# Patient Record
Sex: FEMALE | Race: WHITE | NOT HISPANIC OR LATINO | Employment: FULL TIME | URBAN - METROPOLITAN AREA
[De-identification: names, ages, dates, MRNs, and addresses within clinical notes are randomized per-mention and may not be internally consistent; named-entity substitution may affect disease eponyms.]

---
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
## 2020-01-21 ENCOUNTER — OFFICE VISIT (OUTPATIENT)
Dept: FAMILY MEDICINE CLINIC | Facility: CLINIC | Age: 20
End: 2020-01-21
Payer: COMMERCIAL

## 2020-01-21 ENCOUNTER — APPOINTMENT (OUTPATIENT)
Dept: LAB | Facility: MEDICAL CENTER | Age: 20
End: 2020-01-21
Payer: COMMERCIAL

## 2020-01-21 VITALS
BODY MASS INDEX: 37.05 KG/M2 | WEIGHT: 198 LBS | TEMPERATURE: 98.2 F | DIASTOLIC BLOOD PRESSURE: 80 MMHG | HEART RATE: 76 BPM | SYSTOLIC BLOOD PRESSURE: 118 MMHG | OXYGEN SATURATION: 96 % | RESPIRATION RATE: 16 BRPM

## 2020-01-21 DIAGNOSIS — Z13.220 SCREENING FOR HYPERLIPIDEMIA: ICD-10-CM

## 2020-01-21 DIAGNOSIS — Z23 NEED FOR VACCINATION: ICD-10-CM

## 2020-01-21 DIAGNOSIS — Z77.21 EXPOSURE TO POTENTIALLY HAZARDOUS BODY FLUIDS: ICD-10-CM

## 2020-01-21 DIAGNOSIS — E28.2 PCOS (POLYCYSTIC OVARIAN SYNDROME): ICD-10-CM

## 2020-01-21 DIAGNOSIS — F31.9 BIPOLAR DEPRESSION (HCC): Primary | ICD-10-CM

## 2020-01-21 DIAGNOSIS — Z30.011 OCP (ORAL CONTRACEPTIVE PILLS) INITIATION: ICD-10-CM

## 2020-01-21 DIAGNOSIS — L70.0 ACNE VULGARIS: ICD-10-CM

## 2020-01-21 LAB
ALBUMIN SERPL BCP-MCNC: 3.9 G/DL (ref 3.5–5)
ALP SERPL-CCNC: 98 U/L (ref 46–384)
ALT SERPL W P-5'-P-CCNC: 22 U/L (ref 12–78)
ANION GAP SERPL CALCULATED.3IONS-SCNC: 4 MMOL/L (ref 4–13)
AST SERPL W P-5'-P-CCNC: 11 U/L (ref 5–45)
BILIRUB SERPL-MCNC: 0.17 MG/DL (ref 0.2–1)
BUN SERPL-MCNC: 9 MG/DL (ref 5–25)
CALCIUM SERPL-MCNC: 9.4 MG/DL (ref 8.3–10.1)
CHLORIDE SERPL-SCNC: 107 MMOL/L (ref 100–108)
CO2 SERPL-SCNC: 31 MMOL/L (ref 21–32)
CREAT SERPL-MCNC: 0.9 MG/DL (ref 0.6–1.3)
GFR SERPL CREATININE-BSD FRML MDRD: 93 ML/MIN/1.73SQ M
GLUCOSE SERPL-MCNC: 73 MG/DL (ref 65–140)
HCV AB SER QL: NORMAL
POTASSIUM SERPL-SCNC: 3.8 MMOL/L (ref 3.5–5.3)
PROT SERPL-MCNC: 7.6 G/DL (ref 6.4–8.2)
SODIUM SERPL-SCNC: 142 MMOL/L (ref 136–145)

## 2020-01-21 PROCEDURE — 87491 CHLMYD TRACH DNA AMP PROBE: CPT | Performed by: FAMILY MEDICINE

## 2020-01-21 PROCEDURE — 90471 IMMUNIZATION ADMIN: CPT

## 2020-01-21 PROCEDURE — 86592 SYPHILIS TEST NON-TREP QUAL: CPT

## 2020-01-21 PROCEDURE — 87389 HIV-1 AG W/HIV-1&-2 AB AG IA: CPT

## 2020-01-21 PROCEDURE — 99214 OFFICE O/P EST MOD 30 MIN: CPT | Performed by: FAMILY MEDICINE

## 2020-01-21 PROCEDURE — 86803 HEPATITIS C AB TEST: CPT

## 2020-01-21 PROCEDURE — 80053 COMPREHEN METABOLIC PANEL: CPT | Performed by: FAMILY MEDICINE

## 2020-01-21 PROCEDURE — 36415 COLL VENOUS BLD VENIPUNCTURE: CPT | Performed by: FAMILY MEDICINE

## 2020-01-21 PROCEDURE — 87591 N.GONORRHOEAE DNA AMP PROB: CPT | Performed by: FAMILY MEDICINE

## 2020-01-21 PROCEDURE — 90686 IIV4 VACC NO PRSV 0.5 ML IM: CPT

## 2020-01-21 RX ORDER — QUETIAPINE FUMARATE 25 MG/1
25 TABLET, FILM COATED ORAL
Qty: 90 TABLET | Refills: 1 | Status: SHIPPED | OUTPATIENT
Start: 2020-01-21 | End: 2020-04-14

## 2020-01-21 RX ORDER — DROSPIRENONE AND ETHINYL ESTRADIOL 0.03MG-3MG
1 KIT ORAL DAILY
Qty: 90 TABLET | Refills: 1 | Status: SHIPPED | OUTPATIENT
Start: 2020-01-21 | End: 2020-04-14 | Stop reason: SDUPTHER

## 2020-01-21 RX ORDER — CLINDAMYCIN PHOSPHATE 10 MG/G
GEL TOPICAL 2 TIMES DAILY
Qty: 30 G | Refills: 0 | Status: SHIPPED | OUTPATIENT
Start: 2020-01-21 | End: 2022-06-02

## 2020-01-21 NOTE — ASSESSMENT & PLAN NOTE
Pt has had improvement in depression with Sertraline 25mg, but is now having some more hypomanic symptoms and does have family history of Bipolar 2  Given comorbid sleep disturbances that persist, will do trial of Seroquel 25mg qHS and get baseline labs prior to initiation of meds    Previously tried and failed Lexapro  Will be re-establishing with therapist at school in [place]  Will also place referral for Psychiatry for med management

## 2020-01-21 NOTE — ASSESSMENT & PLAN NOTE
Not much improved with OCPs at this time, so will try different OCP  Add Clindagel in addition to benzoyl peroxide

## 2020-01-21 NOTE — PROGRESS NOTES
FAMILY MEDICINE PROGRESS NOTE  Maximo Mao 23 y o  female   DATE: January 21, 2020     ASSESSMENT and PLAN:  Maximo Mao is a 23 y o  female with:     Problem List Items Addressed This Visit        Endocrine    PCOS (polycystic ovarian syndrome)     Persistent symptoms, unable to lose weight since our last visit  Try Jyoti instead of Danielle  Would also try Metformin in the future if symptoms persist            Musculoskeletal and Integument    Acne vulgaris     Not much improved with OCPs at this time, so will try different OCP  Add Clindagel in addition to benzoyl peroxide         Relevant Medications    clindamycin (CLINDAGEL) 1 % gel       Other    Bipolar depression ([facility] [address] ) - Primary     Pt has had improvement in depression with Sertraline 25mg, but is now having some more hypomanic symptoms and does have family history of Bipolar 2  Given comorbid sleep disturbances that persist, will do trial of Seroquel 25mg qHS and get baseline labs prior to initiation of meds    Previously tried and failed Lexapro  Will be re-establishing with therapist at school in [place]  Will also place referral for Psychiatry for med management         Relevant Medications    QUEtiapine (SEROquel) 25 mg tablet    Other Relevant Orders    Ambulatory referral to Psychiatry    Comprehensive metabolic panel      Other Visit Diagnoses     Exposure to potentially hazardous body fluids        Relevant Orders    HIV 1/2 AG-AB combo    RPR    Chlamydia/GC amplified DNA by PCR    Hepatitis C antibody    Need for vaccination        Relevant Orders    influenza vaccine, [id number], quadrivalent, 0 5 mL, preservative-free, for adult and pediatric patients 6 mos+ (AFLURIA, [address], FLULAVAL, [facility]) (Completed)    Screening for hyperlipidemia        Relevant Orders    Lipid Panel with Direct LDL reflex    OCP (oral contraceptive pills) initiation        Relevant Medications    drospirenone-ethinyl estradiol (JYOTI) 3-0 03 MG per tablet RTC 3 months for recheck and annual  visit    SUBJECTIVE:  Snehal Flaherty is a 23 y o  female who presents today with a chief complaint of Medication Problem  Pt is here to discuss her medications  She is on Jimena for birth control and her periods being abnormal with PCOS, she states she gets her period every 3 months, but still ireregular  Also, has noticed no change in her acne, states she uses benzoyle peroxide and OTC antibiotic soaps without relief  Patient states that she has irregular moods, she has depression and that has been a lot better with the zoloft, but other times her moods are labile she can be crying one minute and fine the next  Also does have anxiety and she will take OTC all natural anxiety gummies that her mother got her  She still is having problems with falling sleep, she gets upset and has racing thoughts  Denies manic episodes, but stays occasionally will have episodes where she feels like she is driven by a motor  She has only been working 3x/week  Her mom has Bipolar 2  Also patient found out that her most recent boyfriend was unfaithful and she is requesting STD screening tests  Review of Systems   Constitutional: Negative for unexpected weight change  Cardiovascular: Negative for palpitations  Genitourinary: Positive for menstrual problem  Psychiatric/Behavioral: Positive for dysphoric mood and sleep disturbance  Negative for suicidal ideas  The patient is nervous/anxious  I have reviewed the patient's Past Medical History  Current Outpatient Medications:     drospirenone-ethinyl estradiol (JIMENA) 3-0 02 MG per tablet, Take 1 tablet by mouth daily, Disp: 30 tablet, Rfl: 4    sertraline (ZOLOFT) 25 mg tablet, Take 1 tablet (25 mg total) by mouth daily, Disp: 90 tablet, Rfl: 2    OBJECTIVE:  /80   Pulse 76   Temp 98 2 °F (36 8 °C)   Resp 16   Wt 89 8 kg (198 lb)   LMP 11/15/2019 (Exact Date)   SpO2 96%   Breastfeeding?  No   BMI 37 05 kg/m² Physical Exam   Constitutional: She appears well-developed and well-nourished  No distress  obese   Skin: She is not diaphoretic  Psychiatric: Her speech is normal and behavior is normal  Judgment and thought content normal  Cognition and memory are normal  She exhibits a depressed mood  She expresses no homicidal and no suicidal ideation  Landy Rivas MD    Note: Portions of the record have been created with voice recognition software  Occasional wrong word or "sound a like" substitutions may have occurred due to the inherent limitations of voice recognition software  Read the chart carefully and recognize, using context, where substitutions have occurred

## 2020-01-21 NOTE — ASSESSMENT & PLAN NOTE
Persistent symptoms, unable to lose weight since our last visit  Try Jyoti instead of Danielle  Would also try Metformin in the future if symptoms persist

## 2020-01-22 LAB
HIV 1+2 AB+HIV1 P24 AG SERPL QL IA: NORMAL
RPR SER QL: NORMAL

## 2020-01-23 LAB
C TRACH DNA SPEC QL NAA+PROBE: NEGATIVE
N GONORRHOEA DNA SPEC QL NAA+PROBE: NEGATIVE

## 2020-04-14 ENCOUNTER — TELEMEDICINE (OUTPATIENT)
Dept: FAMILY MEDICINE CLINIC | Facility: CLINIC | Age: 20
End: 2020-04-14
Payer: COMMERCIAL

## 2020-04-14 DIAGNOSIS — Z30.011 OCP (ORAL CONTRACEPTIVE PILLS) INITIATION: ICD-10-CM

## 2020-04-14 DIAGNOSIS — E28.2 PCOS (POLYCYSTIC OVARIAN SYNDROME): ICD-10-CM

## 2020-04-14 DIAGNOSIS — B96.89 ACUTE BACTERIAL SINUSITIS: Primary | ICD-10-CM

## 2020-04-14 DIAGNOSIS — J01.90 ACUTE BACTERIAL SINUSITIS: Primary | ICD-10-CM

## 2020-04-14 PROCEDURE — 99213 OFFICE O/P EST LOW 20 MIN: CPT | Performed by: FAMILY MEDICINE

## 2020-04-14 RX ORDER — BENZONATATE 200 MG/1
200 CAPSULE ORAL 3 TIMES DAILY PRN
Qty: 20 CAPSULE | Refills: 0 | Status: SHIPPED | OUTPATIENT
Start: 2020-04-14 | End: 2020-05-21

## 2020-04-14 RX ORDER — AMOXICILLIN AND CLAVULANATE POTASSIUM 875; 125 MG/1; MG/1
1 TABLET, FILM COATED ORAL EVERY 12 HOURS SCHEDULED
Qty: 10 TABLET | Refills: 0 | Status: SHIPPED | OUTPATIENT
Start: 2020-04-14 | End: 2020-04-19

## 2020-04-14 RX ORDER — DROSPIRENONE AND ETHINYL ESTRADIOL 0.03MG-3MG
1 KIT ORAL DAILY
Qty: 90 TABLET | Refills: 1 | Status: SHIPPED | OUTPATIENT
Start: 2020-04-14 | End: 2021-04-29 | Stop reason: ALTCHOICE

## 2020-04-23 ENCOUNTER — TELEMEDICINE (OUTPATIENT)
Dept: ENDOCRINOLOGY | Facility: CLINIC | Age: 20
End: 2020-04-23
Payer: COMMERCIAL

## 2020-04-23 DIAGNOSIS — E28.2 PCOS (POLYCYSTIC OVARIAN SYNDROME): Primary | ICD-10-CM

## 2020-04-23 DIAGNOSIS — R53.83 OTHER FATIGUE: ICD-10-CM

## 2020-04-23 DIAGNOSIS — E66.09 CLASS 1 OBESITY DUE TO EXCESS CALORIES WITH SERIOUS COMORBIDITY AND BODY MASS INDEX (BMI) OF 34.0 TO 34.9 IN ADULT: ICD-10-CM

## 2020-04-23 PROCEDURE — 99203 OFFICE O/P NEW LOW 30 MIN: CPT | Performed by: INTERNAL MEDICINE

## 2020-05-11 ENCOUNTER — TELEPHONE (OUTPATIENT)
Dept: FAMILY MEDICINE CLINIC | Facility: CLINIC | Age: 20
End: 2020-05-11

## 2020-05-12 ENCOUNTER — TELEMEDICINE (OUTPATIENT)
Dept: FAMILY MEDICINE CLINIC | Facility: CLINIC | Age: 20
End: 2020-05-12
Payer: COMMERCIAL

## 2020-05-12 ENCOUNTER — LAB (OUTPATIENT)
Dept: LAB | Facility: HOSPITAL | Age: 20
End: 2020-05-12
Attending: INTERNAL MEDICINE
Payer: COMMERCIAL

## 2020-05-12 DIAGNOSIS — E28.2 PCOS (POLYCYSTIC OVARIAN SYNDROME): ICD-10-CM

## 2020-05-12 DIAGNOSIS — N93.9 ABNORMAL UTERINE BLEEDING (AUB): ICD-10-CM

## 2020-05-12 DIAGNOSIS — E66.09 CLASS 1 OBESITY DUE TO EXCESS CALORIES WITH SERIOUS COMORBIDITY AND BODY MASS INDEX (BMI) OF 34.0 TO 34.9 IN ADULT: ICD-10-CM

## 2020-05-12 DIAGNOSIS — N93.9 ABNORMAL UTERINE BLEEDING (AUB): Primary | ICD-10-CM

## 2020-05-12 DIAGNOSIS — R53.83 OTHER FATIGUE: ICD-10-CM

## 2020-05-12 LAB
25(OH)D3 SERPL-MCNC: 20.2 NG/ML (ref 30–100)
EST. AVERAGE GLUCOSE BLD GHB EST-MCNC: 105 MG/DL
HBA1C MFR BLD: 5.3 %
HCG SERPL QL: NEGATIVE
PROLACTIN SERPL-MCNC: 8.1 NG/ML
T4 FREE SERPL-MCNC: 0.96 NG/DL (ref 0.78–1.33)
TSH SERPL DL<=0.05 MIU/L-ACNC: 1.67 UIU/ML (ref 0.46–3.98)

## 2020-05-12 PROCEDURE — 82306 VITAMIN D 25 HYDROXY: CPT

## 2020-05-12 PROCEDURE — 83036 HEMOGLOBIN GLYCOSYLATED A1C: CPT

## 2020-05-12 PROCEDURE — 84146 ASSAY OF PROLACTIN: CPT

## 2020-05-12 PROCEDURE — 84703 CHORIONIC GONADOTROPIN ASSAY: CPT

## 2020-05-12 PROCEDURE — 36415 COLL VENOUS BLD VENIPUNCTURE: CPT

## 2020-05-12 PROCEDURE — 85025 COMPLETE CBC W/AUTO DIFF WBC: CPT

## 2020-05-12 PROCEDURE — 84439 ASSAY OF FREE THYROXINE: CPT

## 2020-05-12 PROCEDURE — 84443 ASSAY THYROID STIM HORMONE: CPT

## 2020-05-12 PROCEDURE — 99214 OFFICE O/P EST MOD 30 MIN: CPT | Performed by: FAMILY MEDICINE

## 2020-05-12 RX ORDER — NAPROXEN 500 MG/1
500 TABLET ORAL 2 TIMES DAILY WITH MEALS
Qty: 20 TABLET | Refills: 0 | Status: SHIPPED | OUTPATIENT
Start: 2020-05-12 | End: 2020-05-21 | Stop reason: SDUPTHER

## 2020-05-13 ENCOUNTER — TELEPHONE (OUTPATIENT)
Dept: ENDOCRINOLOGY | Facility: CLINIC | Age: 20
End: 2020-05-13

## 2020-05-14 ENCOUNTER — TELEMEDICINE (OUTPATIENT)
Dept: FAMILY MEDICINE CLINIC | Facility: CLINIC | Age: 20
End: 2020-05-14
Payer: COMMERCIAL

## 2020-05-14 ENCOUNTER — TELEPHONE (OUTPATIENT)
Dept: FAMILY MEDICINE CLINIC | Facility: CLINIC | Age: 20
End: 2020-05-14

## 2020-05-14 DIAGNOSIS — N93.9 ABNORMAL UTERINE BLEEDING (AUB): Primary | ICD-10-CM

## 2020-05-14 DIAGNOSIS — E55.9 VITAMIN D DEFICIENCY: ICD-10-CM

## 2020-05-14 PROCEDURE — 99213 OFFICE O/P EST LOW 20 MIN: CPT | Performed by: FAMILY MEDICINE

## 2020-05-15 LAB
EOSINOPHIL # BLD AUTO: 0.07 THOUSAND/ΜL (ref 0–0.61)
EOSINOPHIL NFR BLD AUTO: 1 % (ref 0–6)
ERYTHROCYTE [DISTWIDTH] IN BLOOD BY AUTOMATED COUNT: 12.5 % (ref 11.6–15.1)
HCT VFR BLD AUTO: 41.9 % (ref 34.8–46.1)
HGB BLD-MCNC: 13.8 G/DL (ref 11.5–15.4)
LYMPHOCYTES # BLD AUTO: 2.59 THOUSANDS/ΜL (ref 0.6–4.47)
LYMPHOCYTES NFR BLD AUTO: 25 % (ref 14–44)
MCH RBC QN AUTO: 28.9 PG (ref 26.8–34.3)
MCHC RBC AUTO-ENTMCNC: 32.9 G/DL (ref 31.4–37.4)
MCV RBC AUTO: 88 FL (ref 82–98)
MONOCYTES # BLD AUTO: 0.83 THOUSAND/ΜL (ref 0.17–1.22)
MONOCYTES NFR BLD AUTO: 8 % (ref 4–12)
NEUTROPHILS # BLD AUTO: 6.81 THOUSANDS/ΜL (ref 1.85–7.62)
NEUTS SEG NFR BLD AUTO: 66 % (ref 43–75)
NRBC BLD AUTO-RTO: 0 /100 WBCS
PLATELET # BLD AUTO: 280 THOUSANDS/UL (ref 149–390)
PMV BLD AUTO: 11.2 FL (ref 8.9–12.7)
RBC # BLD AUTO: 4.78 MILLION/UL (ref 3.81–5.12)
WBC # BLD AUTO: 10.38 THOUSAND/UL (ref 4.31–10.16)

## 2020-05-21 ENCOUNTER — TELEMEDICINE (OUTPATIENT)
Dept: FAMILY MEDICINE CLINIC | Facility: CLINIC | Age: 20
End: 2020-05-21
Payer: COMMERCIAL

## 2020-05-21 VITALS — BODY MASS INDEX: 35.36 KG/M2 | WEIGHT: 189 LBS

## 2020-05-21 DIAGNOSIS — E28.2 PCOS (POLYCYSTIC OVARIAN SYNDROME): ICD-10-CM

## 2020-05-21 DIAGNOSIS — N93.9 ABNORMAL UTERINE BLEEDING (AUB): Primary | ICD-10-CM

## 2020-05-21 PROCEDURE — 99213 OFFICE O/P EST LOW 20 MIN: CPT | Performed by: FAMILY MEDICINE

## 2020-05-21 RX ORDER — NAPROXEN 500 MG/1
500 TABLET ORAL 2 TIMES DAILY PRN
Qty: 60 TABLET | Refills: 0 | Status: SHIPPED | OUTPATIENT
Start: 2020-05-21 | End: 2022-06-02

## 2020-06-10 ENCOUNTER — TELEPHONE (OUTPATIENT)
Dept: FAMILY MEDICINE CLINIC | Facility: CLINIC | Age: 20
End: 2020-06-10

## 2020-06-11 ENCOUNTER — TELEMEDICINE (OUTPATIENT)
Dept: FAMILY MEDICINE CLINIC | Facility: CLINIC | Age: 20
End: 2020-06-11
Payer: COMMERCIAL

## 2020-06-11 DIAGNOSIS — N93.9 ABNORMAL UTERINE BLEEDING (AUB): Primary | ICD-10-CM

## 2020-06-11 DIAGNOSIS — E28.2 PCOS (POLYCYSTIC OVARIAN SYNDROME): ICD-10-CM

## 2020-06-11 PROCEDURE — 99214 OFFICE O/P EST MOD 30 MIN: CPT | Performed by: FAMILY MEDICINE

## 2020-06-19 ENCOUNTER — TELEPHONE (OUTPATIENT)
Dept: PSYCHIATRY | Facility: CLINIC | Age: 20
End: 2020-06-19

## 2020-09-02 ENCOUNTER — TELEMEDICINE (OUTPATIENT)
Dept: PSYCHIATRY | Facility: CLINIC | Age: 20
End: 2020-09-02
Payer: COMMERCIAL

## 2020-09-02 VITALS — WEIGHT: 205 LBS | BODY MASS INDEX: 38.71 KG/M2 | HEIGHT: 61 IN

## 2020-09-02 DIAGNOSIS — E28.2 PCOS (POLYCYSTIC OVARIAN SYNDROME): ICD-10-CM

## 2020-09-02 DIAGNOSIS — F33.1 MAJOR DEPRESSIVE DISORDER, RECURRENT EPISODE, MODERATE (HCC): Primary | ICD-10-CM

## 2020-09-02 DIAGNOSIS — Z86.59: ICD-10-CM

## 2020-09-02 DIAGNOSIS — F31.9 BIPOLAR DEPRESSION (HCC): ICD-10-CM

## 2020-09-02 DIAGNOSIS — R63.2 BINGE EATING: ICD-10-CM

## 2020-09-02 DIAGNOSIS — F40.10 SOCIAL ANXIETY DISORDER: ICD-10-CM

## 2020-09-02 PROBLEM — F39 MOOD DISORDER (HCC): Status: RESOLVED | Noted: 2019-01-29 | Resolved: 2020-09-02

## 2020-09-02 PROCEDURE — 90792 PSYCH DIAG EVAL W/MED SRVCS: CPT | Performed by: NURSE PRACTITIONER

## 2020-09-02 NOTE — BH TREATMENT PLAN
Treatment  Plan not complete within required time limits of 30 days from initial visit due to:  Current emotional state/ Extensive Medication Management/ Counseling was provided during the session and that took most of the office visit, will do at next OV
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
## 2020-09-28 ENCOUNTER — OFFICE VISIT (OUTPATIENT)
Dept: OBGYN CLINIC | Facility: CLINIC | Age: 20
End: 2020-09-28
Payer: COMMERCIAL

## 2020-09-28 ENCOUNTER — LAB (OUTPATIENT)
Dept: LAB | Facility: CLINIC | Age: 20
End: 2020-09-28
Payer: COMMERCIAL

## 2020-09-28 VITALS
BODY MASS INDEX: 34.97 KG/M2 | TEMPERATURE: 98.6 F | HEIGHT: 61 IN | WEIGHT: 185.2 LBS | DIASTOLIC BLOOD PRESSURE: 74 MMHG | SYSTOLIC BLOOD PRESSURE: 128 MMHG

## 2020-09-28 DIAGNOSIS — F33.1 MAJOR DEPRESSIVE DISORDER, RECURRENT EPISODE, MODERATE (HCC): ICD-10-CM

## 2020-09-28 DIAGNOSIS — F40.10 SOCIAL ANXIETY DISORDER: ICD-10-CM

## 2020-09-28 DIAGNOSIS — E28.2 PCOS (POLYCYSTIC OVARIAN SYNDROME): ICD-10-CM

## 2020-09-28 DIAGNOSIS — Z00.00 WELL WOMAN EXAM (NO GYNECOLOGICAL EXAM): Primary | ICD-10-CM

## 2020-09-28 LAB
ATRIAL RATE: 60 BPM
CHOLEST SERPL-MCNC: 212 MG/DL (ref 50–200)
HDLC SERPL-MCNC: 40 MG/DL
LDLC SERPL CALC-MCNC: 152 MG/DL (ref 0–100)
NONHDLC SERPL-MCNC: 172 MG/DL
P AXIS: 4 DEGREES
PR INTERVAL: 114 MS
QRS AXIS: 31 DEGREES
QRSD INTERVAL: 86 MS
QT INTERVAL: 410 MS
QTC INTERVAL: 410 MS
T WAVE AXIS: 13 DEGREES
TRIGL SERPL-MCNC: 99 MG/DL
VENTRICULAR RATE: 60 BPM

## 2020-09-28 PROCEDURE — 93010 ELECTROCARDIOGRAM REPORT: CPT | Performed by: INTERNAL MEDICINE

## 2020-09-28 PROCEDURE — 99385 PREV VISIT NEW AGE 18-39: CPT | Performed by: OBSTETRICS & GYNECOLOGY

## 2020-09-28 PROCEDURE — 36415 COLL VENOUS BLD VENIPUNCTURE: CPT

## 2020-09-28 PROCEDURE — 93005 ELECTROCARDIOGRAM TRACING: CPT

## 2020-09-28 PROCEDURE — 80061 LIPID PANEL: CPT

## 2020-09-28 NOTE — PROGRESS NOTES
ASSESSMENT & PLAN: Devi Carrasquillo is a 21 y o  Keri Norberto with normal gynecologic exam     1   Routine well woman exam done today  2   Pap testing will start at age 24 per the ASCCP guidelines  3   Gardasil is recommended for patients from 526 years of age  The patient has had the Gardasil vaccine series  4  The patient is sexually active  She accepted contraception and options have been discussed  Has recently been tested for STDs, gets tested annually  5  PCOS: Continues to have irregular periods not well managed on CHCs; however, her acne has greatly improved  Will follow up to discuss further as a problem visit  6  The following were reviewed in today's visit: breast self exam, STD testing, use and side effects of OCPs, exercise and healthy diet  7  Patient to return to office in 3 months for PCOS follow up  All questions have been answered to her satisfaction  CC:  Annual Gynecologic Examination    HPI: Devi Carrasquillo is a 21 y o  Keri Brine who presents for annual gynecologic examination  She has the following concerns:  Wants to know if she definitely has PCOS  Has irregular cycles despite being on/off birth control  Has acne and hair growth along her chin, though her acne has improved since starting [employer]  Had [place] last year with polycystic appearing ovarian morphology  Health Maintenance:    She exercises 0 days per week  She wears her seatbelt routinely  She does perform regular monthly self breast exams  She feels safe at home  She does follow a well balanced diet      She does not use tobacco    Past Medical History:   Diagnosis Date    Anxiety     Depression     PCOS (polycystic ovarian syndrome)     Polycystic ovary syndrome     Varicella     vaccinated       Past Surgical History:   Procedure Laterality Date    WISDOM TOOTH EXTRACTION         Past OB/Gyn History:  Period Pattern: (!) Irregular  Menstrual Flow: Heavy  Menstrual Control: Tampon, Maxi pad, Thin pad  Dysmenorrhea: (!) Moderate  Dysmenorrhea Symptoms: Cramping, HeadachePatient's last menstrual period was 2020 (exact date)  Menstrual History:  OB History        0    Para   0    Term   0       0    AB   0    Living   0       SAB   0    TAB   0    Ectopic   0    Multiple   0    Live Births   0                  History of sexually transmitted infection No  Patient is currently sexually active  heterosexual Birth control: condoms and combination OCPs      Family History   Problem Relation Age of Onset    Depression Mother     Bipolar disorder Mother     Alcohol abuse Mother         sober X 13 years    Depression Family     Anxiety disorder Family     Diabetes Family     Migraines Family     Asthma Father     Alcohol abuse Father     Ovarian cysts Sister     Anxiety disorder Sister     Crohn's disease Maternal Grandmother     Substance Abuse Maternal Uncle         OD heroin and fentanyl    Substance Abuse Paternal Uncle     ADD / ADHD Half-Brother        Social History:  Social History     Socioeconomic History    Marital status: Single     Spouse name: Not on file    Number of children: 0    Years of education: Not on file    Highest education level: High school graduate   Occupational History    Not on file   Social Needs    Financial resource strain: Not on file    Food insecurity     Worry: Not on file     Inability: Not on file   [employer] needs     Medical: Not on file     Non-medical: Not on file   Tobacco Use    Smoking status: Never Smoker    Smokeless tobacco: Never Used   Substance and Sexual Activity    Alcohol use: Not Currently    Drug use: Not Currently     Types: Marijuana    Sexual activity: Yes     Partners: Male     Birth control/protection: OCP   Lifestyle    Physical activity     Days per week: Not on file     Minutes per session: Not on file    Stress: Not on file   Relationships    Social connections     Talks on phone: Not on file Gets together: Not on file     Attends Baptism service: Not on file     Active member of club or organization: Not on file     Attends meetings of clubs or organizations: Not on file     Relationship status: Not on file    Intimate partner violence     Fear of current or ex partner: No     Emotionally abused: No     Physically abused: No     Forced sexual activity: No   Other Topics Concern    Not on file   Social History Narrative    Recreational activities; running     Presently lives with her boyfriend  Patient is co-habitating  No Known Allergies    Current Outpatient Medications:     clindamycin (CLINDAGEL) 1 % gel, Apply topically 2 (two) times a day, Disp: 30 g, Rfl: 0    drospirenone-ethinyl estradiol (HUMERA) 3-0 03 MG per tablet, Take 1 tablet by mouth daily, Disp: 90 tablet, Rfl: 1    mupirocin (BACTROBAN) 2 % ointment, apply SMALL AMOUNT APPLICATION three times a day for 10 days, Disp: , Rfl:     naproxen (NAPROSYN) 500 mg tablet, Take 1 tablet (500 mg total) by mouth 2 (two) times a day as needed for mild pain, Disp: 60 tablet, Rfl: 0    Review of Systems:  Denies fevers, chills, unintentional weight loss, excessive fatigue, chest pain, shortness of breath, abdominal pain, nausea, vomiting, urinary incontinence, urinary frequency, vaginal bleeding, vaginal discharge  All other systems negative unless otherwise stated  Physical Exam:  /74   Temp 98 6 °F (37 °C)   Ht 5' 1" (1 549 m)   Wt 84 kg (185 lb 3 2 oz)   LMP 09/13/2020 (Exact Date)   BMI 34 99 kg/m²  Body mass index is 34 99 kg/m²  GEN: The patient was alert and oriented x3, pleasant well-appearing female in no acute distress     HEENT:  Unremarkable, no anterior or posterior lymphadenopathy, no thyromegaly  CV:  Regular rate and rhythm, normal S1 and S2, no murmurs  RESP:  Clear to auscultation bilaterally, no wheezes, rales or rhonchi  GI:  Soft, nontender, non-distended  MSK: bilateral lower extremities are nontender, no edema

## 2020-11-16 ENCOUNTER — TELEPHONE (OUTPATIENT)
Dept: PSYCHIATRY | Facility: CLINIC | Age: 20
End: 2020-11-16

## 2021-01-13 ENCOUNTER — TELEPHONE (OUTPATIENT)
Dept: PSYCHIATRY | Facility: CLINIC | Age: 21
End: 2021-01-13

## 2021-01-13 NOTE — TELEPHONE ENCOUNTER
Spoke with patient and provided status on tentatively scheduled therapy appointment of 2/10  Lukas Reveles requests resource list be sent to her  Updated in shadow book

## 2021-03-22 ENCOUNTER — TELEMEDICINE (OUTPATIENT)
Dept: PSYCHIATRY | Facility: CLINIC | Age: 21
End: 2021-03-22
Payer: COMMERCIAL

## 2021-03-22 ENCOUNTER — TELEPHONE (OUTPATIENT)
Dept: PSYCHIATRY | Facility: CLINIC | Age: 21
End: 2021-03-22

## 2021-03-22 VITALS — BODY MASS INDEX: 36.63 KG/M2 | WEIGHT: 194 LBS | HEIGHT: 61 IN

## 2021-03-22 DIAGNOSIS — G25.81 RLS (RESTLESS LEGS SYNDROME): ICD-10-CM

## 2021-03-22 DIAGNOSIS — F33.1 MAJOR DEPRESSIVE DISORDER, RECURRENT EPISODE, MODERATE (HCC): Primary | ICD-10-CM

## 2021-03-22 DIAGNOSIS — E28.2 PCOS (POLYCYSTIC OVARIAN SYNDROME): ICD-10-CM

## 2021-03-22 DIAGNOSIS — R63.2 BINGE EATING: ICD-10-CM

## 2021-03-22 DIAGNOSIS — F40.10 SOCIAL ANXIETY DISORDER: ICD-10-CM

## 2021-03-22 PROCEDURE — 99214 OFFICE O/P EST MOD 30 MIN: CPT | Performed by: NURSE PRACTITIONER

## 2021-03-22 PROCEDURE — 90833 PSYTX W PT W E/M 30 MIN: CPT | Performed by: NURSE PRACTITIONER

## 2021-03-22 RX ORDER — FLUOXETINE HYDROCHLORIDE 20 MG/1
20 CAPSULE ORAL DAILY
Qty: 30 CAPSULE | Refills: 2 | Status: SHIPPED | OUTPATIENT
Start: 2021-03-22 | End: 2021-05-27 | Stop reason: SDUPTHER

## 2021-03-22 NOTE — BH TREATMENT PLAN
TREATMENT PLAN (Medication Management Only)        [id number]    Name and Date of Birth:  Dylon Bolton 21 y o  2000  Date of Treatment Plan: March 22, 2021  Diagnosis/Diagnoses:    1  Major depressive disorder, recurrent episode, moderate ([address] )    2  Social anxiety disorder    3  PCOS (polycystic ovarian syndrome)    4  RLS (restless legs syndrome)    5  Binge eating      Strengths/Personal Resources for Self-Care: "I am organized and I am trying to realize my worth"  Area/Areas of need (in own words): "improve my self confidence, controlling my emotions"  1  Long Term Goal: improve mood stability  Target Date:6 months - 9/22/2021  Person/Persons responsible for completion of goal: Lois Gosselin  2  Short Term Objective (s) - How will we reach this goal?:   A  Provider new recommended medication/dosage changes and/or continue medication(s): Medication changes: I am having Aiyana Ruggiero start on FLUoxetine  I am also having her maintain her clindamycin, drospirenone-ethinyl estradiol, naproxen, and mupirocin     B  initiate psychotherapy  C  cook a meal 4 x per week  Target Date:6 months - 9/22/2021  Person/Persons Responsible for Completion of Goal: Lois Gosselin  Progress Towards Goals: continuing treatment  Treatment Modality: medication management every 6 weeks  Review due 180 days from date of this plan: 6 months - 9/22/2021  Expected length of service: ongoing treatment  My Physician/PA/NP and I have developed this plan together and I agree to work on the goals and objectives  I understand the treatment goals that were developed for my treatment    Treatment Plan done but not signed at time of office visit due to:  Plan reviewed by phone or in person  and verbal consent given due to [place] social distancing
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
## 2021-04-19 ENCOUNTER — TELEPHONE (OUTPATIENT)
Dept: PSYCHIATRY | Facility: CLINIC | Age: 21
End: 2021-04-19

## 2021-04-19 NOTE — TELEPHONE ENCOUNTER
Ava Bolton can still see Allan Espinoza if she would like to have follow-up between now and then and keep her appointment in April without the labs  We can just review her labs the may appointment  This is an option please check with her as he which would like to do

## 2021-04-19 NOTE — TELEPHONE ENCOUNTER
----- Message from [facility] sent at 4/19/2021  1:15 PM EDT -----  Regarding: Cx  Pt called to cx her 4/27 appt @ 12  She said she cannot get the blood work that Patti Duque wanted her to get until 4/29  No appts avalible between then and her next one in May

## 2021-04-20 NOTE — TELEPHONE ENCOUNTER
Spoke with patient explained she can still keep appointment in April   So she she would like that so I put her back in the original date and time she had  4/27 @ 12pm

## 2021-04-27 ENCOUNTER — TELEMEDICINE (OUTPATIENT)
Dept: PSYCHIATRY | Facility: CLINIC | Age: 21
End: 2021-04-27
Payer: COMMERCIAL

## 2021-04-27 VITALS — WEIGHT: 193 LBS | HEIGHT: 61 IN | BODY MASS INDEX: 36.44 KG/M2

## 2021-04-27 DIAGNOSIS — E28.2 PCOS (POLYCYSTIC OVARIAN SYNDROME): ICD-10-CM

## 2021-04-27 DIAGNOSIS — G25.81 RLS (RESTLESS LEGS SYNDROME): ICD-10-CM

## 2021-04-27 DIAGNOSIS — F40.10 SOCIAL ANXIETY DISORDER: ICD-10-CM

## 2021-04-27 DIAGNOSIS — R63.2 BINGE EATING: ICD-10-CM

## 2021-04-27 DIAGNOSIS — Z86.59: ICD-10-CM

## 2021-04-27 DIAGNOSIS — E66.09 CLASS 1 OBESITY DUE TO EXCESS CALORIES WITH SERIOUS COMORBIDITY AND BODY MASS INDEX (BMI) OF 34.0 TO 34.9 IN ADULT: ICD-10-CM

## 2021-04-27 DIAGNOSIS — F33.1 MAJOR DEPRESSIVE DISORDER, RECURRENT EPISODE, MODERATE (HCC): Primary | ICD-10-CM

## 2021-04-27 PROCEDURE — 99214 OFFICE O/P EST MOD 30 MIN: CPT | Performed by: NURSE PRACTITIONER

## 2021-04-27 PROCEDURE — 90833 PSYTX W PT W E/M 30 MIN: CPT | Performed by: NURSE PRACTITIONER

## 2021-04-27 NOTE — PSYCH
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 episode, moderate (HCC)    Social anxiety disorder    PCOS (polycystic ovarian syndrome)    Class 1 obesity due to excess calories with serious comorbidity and body mass index (BMI) of 34 0 to 34 9 in adult    RLS (restless legs syndrome)    Binge eating    History of body image disturbance          Treatment Recommendations/Precautions/Plan:      Sean Hopkins feels as though she has been doing much better since initiating treatment on fluoxetine  She states that her sleep has been better however she continues to have some vivid dreams  She states that her energy and motivation have also improved  She does feel somewhat sleepy during the day taking fluoxetine in the morning  She was instructed to switch to evening however she was instructed to monitor if she is having difficulty with sleep and if so to switch back to a m  dosing    Her depressive and anxiety symptoms have both decreasing difficultly since initiation of fluoxetine  She has only been on for approximately 4 weeks and we will continue current dosing of 20 mg until at least next visit to given a full 6-8 weeks prior to doing dose adjustment  She has not had any feelings of panic or anxiety attacks since initiating treatment  Akbar Purvis has initiated a new diet with her mother "we are in competition  "  She states that they are avoiding carbs and dairy and they are maintaining a 1200 calorie per day diet  She states that she is not bingeing she is scheduling her meals she has initiated walking  She adamantly denies any purging or restricting and she does not have a history of these to things  She does however have a history of bingeing however this is not been an issue at this time  She states that she her restless leg issues have been much better however on Sunday she did have some issues and this is the 1st incidence in quite some time  She continues to maintain a good work schedule, she is having a good sleep schedule she denies nightmares however she is having vivid dreams  She is scheduled to see her OBGYN for evaluation of PCOS and routine yearly evaluation  At that time she will make sure that they do not any additional blood work and then she will have her labs done at this provider ordered as well  She has not had any SI/HI, no auditory visual hallucinations, no delusional thinking  She is in the process of looking for therapist     Education about diagnosis and treatment modalities, patient voiced understanding and agreement with the following plan:     -   Continue Prozac/ fluoxetine 20 mg p o  daily for continued improvement in symptoms of binge eating, anxiety and depression  No refills required today  -   Patient will have labs drawn prior to next appointment  No new labs to review today      -  Akbar Purvis will call her insurance company to see what psychotherapist are in network, she states she has not had the opportunity to do so as of yet however she does verbalize understanding of the importance of this  -  Follow-up with this provider 05/27/2021 and 07/06/2021    -Patient will call if issues or concerns     -  Follow-up with OBGYN for PCOS and birth control maintenance  -   Continue to follow with PCP for routine medical care, yearly labs, any other medical concerns  - continue to use self monitoring of symptoms, and symptom monitoring tools  - confirmed that patient has 24 hours and weekend coverage for urgent situations accessed by calling the main clinic phone number  - confirmed that patient has [employer] Numbers and the [address] Number Provided to Patient     -Treatment Plan completed electronically 03/22/2021 verbal consent obtained due to virtual visit format and COVID-19 pandemic protocol    Psychotherapy Provided:       Individual psychotherapy provided: Yes  Counseling was provided during the session today for 19 minutes  Medications, treatment progress and treatment plan reviewed with Paty Zuluaga  Medication changes discussed with Paty Zuluaga  Medication education provided to Paty Zuluaga  Recent stressor including stress at work, social difficulties and ongoing anxiety discussed with Paty Zuluaga  Coping strategies reviewed with Paty Zuluaga  Importance of medication and treatment compliance reviewed with Paty Zuluaga  Importance of follow up with family physician for medical issues reviewed with Paty Zuluaga  Reassurance and supportive therapy provided  Crisis/safety plan discussed with Paty Zuluaga  This note was shared with patient         TROY Cerna 04/27/21

## 2021-04-28 NOTE — PROGRESS NOTES
Assessment/Plan:     Start nuva ring at the end of her current week of pills  Do no take placebo pills  Use back up method of contraception x 7 days  Rx sent to pharmacy on file  Although always condom use encouraged  Benefits, risks and alternatives of birth control discussed  ACHES reviewed  Exercise 150 minutes per week  Minimum  Weight loss discussed to BMI < 30 and ideally closer to 25  Continue with healthy diet  Declines referral to nutritionist    Complete labs ordered by PCP  I will be cc'ed on TSH with reflex to FT4, pregnancy test and CDC with diff  Will return in 2 weeks for annual exam  Cancel if vaginal bleeding restarts  STI testing requested  Labs ordered  Call insurance to verify coverage prior to having lab drawn  Will do GC/CT with pap at next visit  Diagnoses and all orders for this visit:    Abnormal uterine bleeding (AUB)  -     etonogestrel-ethinyl estradiol (NUVARING) 0 12-0 015 MG/24HR vaginal ring; Insert vaginally and leave in place for 3 consecutive weeks, then remove for 1 week  Screening for STD (sexually transmitted disease)  -     Human Immunodeficiency Virus 1/2 Antigen / Antibody ( Fourth Generation) with Reflex Testing; Future  -     RPR; Future  -     Hepatitis C antibody; Future  -     Herpes I /II IgM Ab Indriect; Future  -     Herpes I/II IgG XIN w Reflex to HSV-2; Future    BMI 35 0-35 9,adult          Subjective:      Patient ID: Terence Bowling is a 21 y o  female  Terence Bowling is a 21 y o  female who is here today for a problem visit   She was hoping to have an annual exam but has health concerns and started her menses today  C/o irregular menses  Menarche age 6  Never regular menses  Started birth control at age 15 for menses regulation  She was amenorrheic on DMPA but switched to a ADOLFO by her PCP and menses has been irregular since  Typically bleeds 2-3 weeks of every months with varied flow  Compliant with ADOLFO daily   She is requesting a change of birth control  Pregnancy is not desired for a few years  Ofe Echols is sexually active with male partner of 3 years  Admits to sometimes condom use  She is just starting to improve her diet and increase exercising  Requesting STI testing today  The following portions of the patient's history were reviewed and updated as appropriate: allergies, current medications, past family history, past medical history, past social history, past surgical history and problem list     Review of Systems   Constitutional: Negative  Eyes: Negative for visual disturbance  Respiratory: Negative for chest tightness and shortness of breath  Cardiovascular: Negative for chest pain, palpitations and leg swelling  Gastrointestinal: Negative for abdominal pain, constipation, diarrhea, nausea and vomiting  Genitourinary: Positive for menstrual problem  Negative for difficulty urinating, dyspareunia, dysuria, pelvic pain, vaginal bleeding, vaginal discharge and vaginal pain  Skin: Negative  Neurological: Negative for weakness, light-headedness and headaches  Hematological: Negative for adenopathy  Psychiatric/Behavioral: Negative  All other systems reviewed and are negative  Objective:      /64   Pulse 70   Ht 5' 1" (1 549 m)   Wt 86 2 kg (190 lb)   LMP 04/29/2021 (Exact Date)   Breastfeeding No   BMI 35 90 kg/m²          Physical Exam  Vitals signs and nursing note reviewed  Constitutional:       Appearance: She is well-developed  She is obese  Neck:      Musculoskeletal: Normal range of motion  Thyroid: No thyromegaly  Trachea: No tracheal deviation  Cardiovascular:      Rate and Rhythm: Normal rate and regular rhythm  Heart sounds: Normal heart sounds  Pulmonary:      Effort: Pulmonary effort is normal       Breath sounds: Normal breath sounds  Abdominal:      General: There is no distension  Palpations: Abdomen is soft  There is no mass  Tenderness: There is no abdominal tenderness  There is no guarding or rebound  Lymphadenopathy:      Cervical: No cervical adenopathy  Skin:     General: Skin is warm and dry  Neurological:      Mental Status: She is alert and oriented to person, place, and time     Psychiatric:         Mood and Affect: Mood normal

## 2021-04-29 ENCOUNTER — ANNUAL EXAM (OUTPATIENT)
Dept: OBGYN CLINIC | Facility: CLINIC | Age: 21
End: 2021-04-29
Payer: COMMERCIAL

## 2021-04-29 ENCOUNTER — APPOINTMENT (OUTPATIENT)
Dept: LAB | Facility: CLINIC | Age: 21
End: 2021-04-29
Payer: COMMERCIAL

## 2021-04-29 ENCOUNTER — TRANSCRIBE ORDERS (OUTPATIENT)
Dept: LAB | Facility: CLINIC | Age: 21
End: 2021-04-29

## 2021-04-29 VITALS
DIASTOLIC BLOOD PRESSURE: 64 MMHG | SYSTOLIC BLOOD PRESSURE: 118 MMHG | HEART RATE: 70 BPM | BODY MASS INDEX: 35.87 KG/M2 | HEIGHT: 61 IN | WEIGHT: 190 LBS

## 2021-04-29 DIAGNOSIS — Z11.3 SCREENING FOR STD (SEXUALLY TRANSMITTED DISEASE): ICD-10-CM

## 2021-04-29 DIAGNOSIS — E28.2 PCOS (POLYCYSTIC OVARIAN SYNDROME): ICD-10-CM

## 2021-04-29 DIAGNOSIS — R63.2 BINGE EATING: ICD-10-CM

## 2021-04-29 DIAGNOSIS — Z11.3 SCREENING FOR STDS (SEXUALLY TRANSMITTED DISEASES): ICD-10-CM

## 2021-04-29 DIAGNOSIS — Z11.3 SCREENING FOR STDS (SEXUALLY TRANSMITTED DISEASES): Primary | ICD-10-CM

## 2021-04-29 DIAGNOSIS — N93.9 ABNORMAL UTERINE BLEEDING (AUB): Primary | ICD-10-CM

## 2021-04-29 DIAGNOSIS — F33.1 MAJOR DEPRESSIVE DISORDER, RECURRENT EPISODE, MODERATE (HCC): ICD-10-CM

## 2021-04-29 DIAGNOSIS — Z13.220 SCREENING FOR HYPERLIPIDEMIA: ICD-10-CM

## 2021-04-29 DIAGNOSIS — F40.10 SOCIAL ANXIETY DISORDER: ICD-10-CM

## 2021-04-29 LAB
ALBUMIN SERPL BCP-MCNC: 4.1 G/DL (ref 3.5–5)
ALP SERPL-CCNC: 63 U/L (ref 46–116)
ALT SERPL W P-5'-P-CCNC: 21 U/L (ref 12–78)
ANION GAP SERPL CALCULATED.3IONS-SCNC: 6 MMOL/L (ref 4–13)
AST SERPL W P-5'-P-CCNC: 15 U/L (ref 5–45)
BASOPHILS # BLD AUTO: 0.04 THOUSANDS/ΜL (ref 0–0.1)
BASOPHILS NFR BLD AUTO: 1 % (ref 0–1)
BILIRUB SERPL-MCNC: 0.4 MG/DL (ref 0.2–1)
BUN SERPL-MCNC: 10 MG/DL (ref 5–25)
CALCIUM SERPL-MCNC: 9.4 MG/DL (ref 8.3–10.1)
CHLORIDE SERPL-SCNC: 105 MMOL/L (ref 100–108)
CHOLEST SERPL-MCNC: 190 MG/DL (ref 50–200)
CO2 SERPL-SCNC: 28 MMOL/L (ref 21–32)
CREAT SERPL-MCNC: 0.74 MG/DL (ref 0.6–1.3)
EOSINOPHIL # BLD AUTO: 0.06 THOUSAND/ΜL (ref 0–0.61)
EOSINOPHIL NFR BLD AUTO: 1 % (ref 0–6)
ERYTHROCYTE [DISTWIDTH] IN BLOOD BY AUTOMATED COUNT: 12.3 % (ref 11.6–15.1)
GFR SERPL CREATININE-BSD FRML MDRD: 117 ML/MIN/1.73SQ M
GLUCOSE P FAST SERPL-MCNC: 86 MG/DL (ref 65–99)
HCG SERPL QL: NEGATIVE
HCT VFR BLD AUTO: 42.8 % (ref 34.8–46.1)
HCV AB SER QL: NORMAL
HDLC SERPL-MCNC: 34 MG/DL
HGB BLD-MCNC: 14.3 G/DL (ref 11.5–15.4)
IMM GRANULOCYTES # BLD AUTO: 0.01 THOUSAND/UL (ref 0–0.2)
IMM GRANULOCYTES NFR BLD AUTO: 0 % (ref 0–2)
LDLC SERPL CALC-MCNC: 144 MG/DL (ref 0–100)
LYMPHOCYTES # BLD AUTO: 1.79 THOUSANDS/ΜL (ref 0.6–4.47)
LYMPHOCYTES NFR BLD AUTO: 30 % (ref 14–44)
MCH RBC QN AUTO: 28.4 PG (ref 26.8–34.3)
MCHC RBC AUTO-ENTMCNC: 33.4 G/DL (ref 31.4–37.4)
MCV RBC AUTO: 85 FL (ref 82–98)
MONOCYTES # BLD AUTO: 0.42 THOUSAND/ΜL (ref 0.17–1.22)
MONOCYTES NFR BLD AUTO: 7 % (ref 4–12)
NEUTROPHILS # BLD AUTO: 3.73 THOUSANDS/ΜL (ref 1.85–7.62)
NEUTS SEG NFR BLD AUTO: 61 % (ref 43–75)
NRBC BLD AUTO-RTO: 0 /100 WBCS
PLATELET # BLD AUTO: 274 THOUSANDS/UL (ref 149–390)
PMV BLD AUTO: 11.4 FL (ref 8.9–12.7)
POTASSIUM SERPL-SCNC: 3.9 MMOL/L (ref 3.5–5.3)
PROT SERPL-MCNC: 7.4 G/DL (ref 6.4–8.2)
RBC # BLD AUTO: 5.04 MILLION/UL (ref 3.81–5.12)
SODIUM SERPL-SCNC: 139 MMOL/L (ref 136–145)
TRIGL SERPL-MCNC: 61 MG/DL
TSH SERPL DL<=0.05 MIU/L-ACNC: 1.75 UIU/ML (ref 0.46–3.98)
WBC # BLD AUTO: 6.05 THOUSAND/UL (ref 4.31–10.16)

## 2021-04-29 PROCEDURE — 99213 OFFICE O/P EST LOW 20 MIN: CPT | Performed by: NURSE PRACTITIONER

## 2021-04-29 PROCEDURE — 85025 COMPLETE CBC W/AUTO DIFF WBC: CPT

## 2021-04-29 PROCEDURE — 84703 CHORIONIC GONADOTROPIN ASSAY: CPT

## 2021-04-29 PROCEDURE — 86803 HEPATITIS C AB TEST: CPT

## 2021-04-29 PROCEDURE — 84443 ASSAY THYROID STIM HORMONE: CPT

## 2021-04-29 PROCEDURE — 80061 LIPID PANEL: CPT

## 2021-04-29 PROCEDURE — 86592 SYPHILIS TEST NON-TREP QUAL: CPT

## 2021-04-29 PROCEDURE — 86695 HERPES SIMPLEX TYPE 1 TEST: CPT

## 2021-04-29 PROCEDURE — 86696 HERPES SIMPLEX TYPE 2 TEST: CPT

## 2021-04-29 PROCEDURE — 87389 HIV-1 AG W/HIV-1&-2 AB AG IA: CPT

## 2021-04-29 PROCEDURE — 80053 COMPREHEN METABOLIC PANEL: CPT

## 2021-04-29 PROCEDURE — 36415 COLL VENOUS BLD VENIPUNCTURE: CPT

## 2021-04-29 RX ORDER — ETONOGESTREL AND ETHINYL ESTRADIOL 11.7; 2.7 MG/1; MG/1
INSERT, EXTENDED RELEASE VAGINAL
Qty: 1 EACH | Refills: 2 | Status: SHIPPED | OUTPATIENT
Start: 2021-04-29 | End: 2022-06-02

## 2021-04-29 NOTE — PATIENT INSTRUCTIONS
Start nuva ring at the end of her current week of pills  Do no take placebo pills  Use back up method of contraception x 7 days  Rx sent to pharmacy on file  Although always condom use encouraged  Benefits, risks and alternatives of birth control discussed  ACHES reviewed  Exercise 150 minutes per week  Minimum  Weight loss discussed to BMI < 30 and ideally closer to 25  Continue with healthy diet  Declines referral to nutritionist    Complete labs ordered by PCP  I will be cc'ed on TSH with reflex to FT4, pregnancy test and CDC with diff  Start nuva ring at the end of her current week of pills  Do no take placebo pills  Use back up method of contraception x 7 days  Rx sent to pharmacy on file  Although always condom use encouraged  Benefits, risks and alternatives of birth control discussed  ACHES reviewed  Exercise 150 minutes per week  Minimum  Weight loss discussed to BMI < 30 and ideally closer to 25  Continue with healthy diet  Declines referral to nutritionist    Complete labs ordered by PCP  I will be cc'ed on TSH with reflex to FT4, pregnancy test and CDC with diff  Will return in 2 weeks for annual exam  Cancel if vaginal bleeding restarts  STI testing requested  Labs ordered  Call insurance to verify coverage prior to having lab drawn  Will do GC/CT with pap at next visit

## 2021-04-30 LAB
HIV 1+2 AB+HIV1 P24 AG SERPL QL IA: NORMAL
HSV1 IGG SER IA-ACNC: <0.91 INDEX (ref 0–0.9)
HSV2 IGG SER IA-ACNC: <0.91 INDEX (ref 0–0.9)
RPR SER QL: NORMAL

## 2021-05-03 LAB
HSV1 IGM TITR SER IF: NORMAL TITER
HSV2 IGM TITR SER IF: NORMAL TITER

## 2021-05-27 ENCOUNTER — TELEMEDICINE (OUTPATIENT)
Dept: PSYCHIATRY | Facility: CLINIC | Age: 21
End: 2021-05-27
Payer: COMMERCIAL

## 2021-05-27 VITALS — BODY MASS INDEX: 36.63 KG/M2 | WEIGHT: 194 LBS | HEIGHT: 61 IN

## 2021-05-27 DIAGNOSIS — F40.10 SOCIAL ANXIETY DISORDER: ICD-10-CM

## 2021-05-27 DIAGNOSIS — F33.1 MAJOR DEPRESSIVE DISORDER, RECURRENT EPISODE, MODERATE (HCC): Primary | ICD-10-CM

## 2021-05-27 DIAGNOSIS — Z86.59: ICD-10-CM

## 2021-05-27 DIAGNOSIS — R63.2 BINGE EATING: ICD-10-CM

## 2021-05-27 DIAGNOSIS — E28.2 PCOS (POLYCYSTIC OVARIAN SYNDROME): ICD-10-CM

## 2021-05-27 DIAGNOSIS — E66.09 CLASS 1 OBESITY DUE TO EXCESS CALORIES WITH SERIOUS COMORBIDITY AND BODY MASS INDEX (BMI) OF 34.0 TO 34.9 IN ADULT: ICD-10-CM

## 2021-05-27 DIAGNOSIS — G25.81 RLS (RESTLESS LEGS SYNDROME): ICD-10-CM

## 2021-05-27 PROCEDURE — 90833 PSYTX W PT W E/M 30 MIN: CPT | Performed by: NURSE PRACTITIONER

## 2021-05-27 PROCEDURE — 99214 OFFICE O/P EST MOD 30 MIN: CPT | Performed by: NURSE PRACTITIONER

## 2021-05-27 RX ORDER — FLUOXETINE HYDROCHLORIDE 40 MG/1
40 CAPSULE ORAL DAILY
Qty: 30 CAPSULE | Refills: 2 | Status: SHIPPED | OUTPATIENT
Start: 2021-05-27 | End: 2021-07-06 | Stop reason: SDUPTHER

## 2021-05-27 NOTE — PSYCH
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 feelings of depression anxiety and appetite   /Bingeing  She denies suicidal homicidal ideation, she denies auditory visual hallucinations, she denies delusional thinking  She has some challenges with energy and motivation, she is sleeping well  Sean Hopkins continues to struggle with body image as well  Education about diagnosis and treatment modalities, patient voiced understanding and agreement with the following plan:     -    Increase fluoxetine/ Prozac from 20 mg p o  daily to 40 mg p o  daily for continued improvement in symptoms of anxiety depression and binge eating  Patient Education completed including serotonin syndrome, SIADH, worsening depression, suicidality, induction of avani, GI upset, headaches, activation, sexual side effects, sedation, potential drug interactions, and others   30 day supply +2 refills sent to pharmacy 05/27/2021     -     Labs reviewed today no additional labs required    -  Sean Hopkins will call her insurance company to see what psychotherapist are in network, she states she has not had the opportunity to do so as of yet however she does verbalize understanding of the importance of this  -  Follow-up with this provider 7/06/2021    Danisha Gil to call office with any issues or concerns     - continue to follow with OBGYN for birth control maintenance and PCOS  -  Maintain follow-up with primary care physician for routine yearly labs, medical concerns and any other routine medical care  -  Continue to use monitoring tools for monitoring of symptoms /mood charting     -  Patient has 24 hours and weekend coverage for urgent situations accessed by calling the main clinic phone number      -  Kaykay Sevilla has the [facility] Telephone Numbers and the National Suicide Prevention Hotline Number Provided to Patient     -Treatment Plan completed electronically 03/22/2021 verbal consent obtained due to virtual visit format and COVID-19 pandemic protocol    Psychotherapy Provided:     Individual psychotherapy provided: Yes  Counseling was provided during the session today for 18 minutes  Medications, treatment progress and treatment plan reviewed with Kaykay Sevilla  Medication changes discussed with Kaykay Sevilla  Medication education provided to Kaykay Sevilla  Recent stressor including ongoing anxiety and Challenges with body image and appetite discussed with Kaykay Sevilla  Coping strategies reviewed with Kaykay Sevilla  Importance of medication and treatment compliance reviewed with Kaykay Sevilla  Importance of follow up with family physician for medical issues reviewed with Kaykay Sevilla  Reassurance and supportive therapy provided  Crisis/safety plan discussed with Kaykay Sevilla  Encouraged to follow-up with OBGYN     This note was shared with patient           TROY Avery 05/27/21

## 2021-07-06 ENCOUNTER — TELEMEDICINE (OUTPATIENT)
Dept: PSYCHIATRY | Facility: CLINIC | Age: 21
End: 2021-07-06
Payer: COMMERCIAL

## 2021-07-06 VITALS — HEIGHT: 61 IN | WEIGHT: 200 LBS | BODY MASS INDEX: 37.76 KG/M2

## 2021-07-06 DIAGNOSIS — R63.2 BINGE EATING: ICD-10-CM

## 2021-07-06 DIAGNOSIS — E28.2 PCOS (POLYCYSTIC OVARIAN SYNDROME): ICD-10-CM

## 2021-07-06 DIAGNOSIS — G25.81 RLS (RESTLESS LEGS SYNDROME): ICD-10-CM

## 2021-07-06 DIAGNOSIS — E66.09 CLASS 1 OBESITY DUE TO EXCESS CALORIES WITH SERIOUS COMORBIDITY AND BODY MASS INDEX (BMI) OF 34.0 TO 34.9 IN ADULT: ICD-10-CM

## 2021-07-06 DIAGNOSIS — Z86.59: ICD-10-CM

## 2021-07-06 DIAGNOSIS — F40.10 SOCIAL ANXIETY DISORDER: ICD-10-CM

## 2021-07-06 DIAGNOSIS — F33.1 MAJOR DEPRESSIVE DISORDER, RECURRENT EPISODE, MODERATE (HCC): Primary | ICD-10-CM

## 2021-07-06 PROCEDURE — 90833 PSYTX W PT W E/M 30 MIN: CPT | Performed by: NURSE PRACTITIONER

## 2021-07-06 PROCEDURE — 99214 OFFICE O/P EST MOD 30 MIN: CPT | Performed by: NURSE PRACTITIONER

## 2021-07-06 RX ORDER — FLUOXETINE HYDROCHLORIDE 40 MG/1
40 CAPSULE ORAL DAILY
Qty: 90 CAPSULE | Refills: 0 | Status: SHIPPED | OUTPATIENT
Start: 2021-07-06 | End: 2022-06-02 | Stop reason: ALTCHOICE

## 2021-07-06 NOTE — PSYCH
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 states she has not had the opportunity to do so as of yet however she does verbalize understanding of the importance of this  -  Follow-up with this provider 8/30/2021 virtual visit     -Bouchra Cramer to call office with any issues or concerns     - continue to follow with Monique Rodrigues for birth control maintenance and PCOS  -  Maintain follow-up with [address] [place] [address] physician for routine yearly labs, medical concerns and any other routine medical care      -  Continue to use monitoring tools for monitoring of symptoms /mood charting     -  Patient has 24 hours and weekend coverage for urgent situations accessed by calling the main clinic phone number      -  Bouchra Cramer has the [facility] Telephone Numbers and the National Suicide Prevention Hotline Number Provided to Patient     -Treatment Plan completed electronically 03/22/2021 verbal consent obtained due to virtual visit format and COVID-19 pandemic protocol    Psychotherapy Provided:       Individual psychotherapy provided: Yes  Counseling was provided during the session today for 16 minutes  Medications, treatment progress and treatment plan reviewed with Angelito Garcia  Medication changes discussed with Angelito Garcia  Medication education provided to Angelito Garcia  Recent stressor including social difficulties discussed with Angelito Garcia  Coping strategies including contacting a therapist, getting into a good routine, improving self-esteem and positive affirmations reviewed with Angelito Garcia  Importance of medication and treatment compliance reviewed with Angelito Garcia  Importance of follow up with family physician for medical issues reviewed with Angelito Garcia  Reassurance and supportive therapy provided  Crisis/safety plan discussed with Angelito Garcia  This note was shared with patient           Ja TROY Ibarra 07/06/21 negative

## 2021-08-31 ENCOUNTER — TELEPHONE (OUTPATIENT)
Dept: PSYCHIATRY | Facility: CLINIC | Age: 21
End: 2021-08-31

## 2022-04-20 ENCOUNTER — TELEPHONE (OUTPATIENT)
Dept: PSYCHIATRY | Facility: CLINIC | Age: 22
End: 2022-04-20

## 2022-06-02 ENCOUNTER — HOSPITAL ENCOUNTER (EMERGENCY)
Facility: HOSPITAL | Age: 22
Discharge: HOME | End: 2022-06-02
Attending: EMERGENCY MEDICINE
Payer: COMMERCIAL

## 2022-06-02 ENCOUNTER — APPOINTMENT (EMERGENCY)
Dept: CT IMAGING | Facility: HOSPITAL | Age: 22
End: 2022-06-02
Payer: COMMERCIAL

## 2022-06-02 VITALS
WEIGHT: 205 LBS | HEIGHT: 61 IN | OXYGEN SATURATION: 95 % | RESPIRATION RATE: 15 BRPM | TEMPERATURE: 98.2 F | SYSTOLIC BLOOD PRESSURE: 112 MMHG | DIASTOLIC BLOOD PRESSURE: 70 MMHG | BODY MASS INDEX: 38.71 KG/M2 | HEART RATE: 72 BPM

## 2022-06-02 DIAGNOSIS — R10.9 ABDOMINAL PAIN: Primary | ICD-10-CM

## 2022-06-02 LAB
ALBUMIN SERPL BCP-MCNC: 3.9 G/DL (ref 3.5–5)
ALP SERPL-CCNC: 78 U/L (ref 46–116)
ALT SERPL W P-5'-P-CCNC: 20 U/L (ref 12–78)
ANION GAP SERPL CALCULATED.3IONS-SCNC: 6 MMOL/L (ref 4–13)
APTT PPP: 30 SECONDS (ref 23–37)
AST SERPL W P-5'-P-CCNC: 27 U/L (ref 5–45)
BACTERIA UR QL AUTO: ABNORMAL /HPF
BASOPHILS # BLD AUTO: 0.03 THOUSANDS/ΜL (ref 0–0.1)
BASOPHILS NFR BLD AUTO: 0 % (ref 0–1)
BILIRUB SERPL-MCNC: 0.3 MG/DL (ref 0.2–1)
BILIRUB UR QL STRIP: NEGATIVE
BUN SERPL-MCNC: 9 MG/DL (ref 5–25)
CALCIUM SERPL-MCNC: 9.1 MG/DL (ref 8.3–10.1)
CHLORIDE SERPL-SCNC: 106 MMOL/L (ref 100–108)
CLARITY UR: ABNORMAL
CO2 SERPL-SCNC: 29 MMOL/L (ref 21–32)
COLOR UR: YELLOW
CREAT SERPL-MCNC: 0.87 MG/DL (ref 0.6–1.3)
D DIMER PPP FEU-MCNC: 0.38 UG/ML FEU
EOSINOPHIL # BLD AUTO: 0.03 THOUSAND/ΜL (ref 0–0.61)
EOSINOPHIL NFR BLD AUTO: 0 % (ref 0–6)
ERYTHROCYTE [DISTWIDTH] IN BLOOD BY AUTOMATED COUNT: 13.1 % (ref 11.6–15.1)
EXT PREG TEST URINE: NEGATIVE
EXT. CONTROL ED NAV: NORMAL
GFR SERPL CREATININE-BSD FRML MDRD: 94 ML/MIN/1.73SQ M
GLUCOSE SERPL-MCNC: 107 MG/DL (ref 65–140)
GLUCOSE UR STRIP-MCNC: NEGATIVE MG/DL
HCT VFR BLD AUTO: 41.2 % (ref 34.8–46.1)
HGB BLD-MCNC: 13.4 G/DL (ref 11.5–15.4)
HGB UR QL STRIP.AUTO: ABNORMAL
IMM GRANULOCYTES # BLD AUTO: 0.02 THOUSAND/UL (ref 0–0.2)
IMM GRANULOCYTES NFR BLD AUTO: 0 % (ref 0–2)
INR PPP: 0.98 (ref 0.84–1.19)
KETONES UR STRIP-MCNC: NEGATIVE MG/DL
LEUKOCYTE ESTERASE UR QL STRIP: ABNORMAL
LIPASE SERPL-CCNC: 126 U/L (ref 73–393)
LYMPHOCYTES # BLD AUTO: 1.92 THOUSANDS/ΜL (ref 0.6–4.47)
LYMPHOCYTES NFR BLD AUTO: 22 % (ref 14–44)
MCH RBC QN AUTO: 27.1 PG (ref 26.8–34.3)
MCHC RBC AUTO-ENTMCNC: 32.5 G/DL (ref 31.4–37.4)
MCV RBC AUTO: 83 FL (ref 82–98)
MONOCYTES # BLD AUTO: 0.66 THOUSAND/ΜL (ref 0.17–1.22)
MONOCYTES NFR BLD AUTO: 8 % (ref 4–12)
MUCOUS THREADS UR QL AUTO: ABNORMAL
NEUTROPHILS # BLD AUTO: 6.06 THOUSANDS/ΜL (ref 1.85–7.62)
NEUTS SEG NFR BLD AUTO: 70 % (ref 43–75)
NITRITE UR QL STRIP: NEGATIVE
NON-SQ EPI CELLS URNS QL MICRO: ABNORMAL /HPF
NRBC BLD AUTO-RTO: 0 /100 WBCS
PH UR STRIP.AUTO: 5.5 [PH]
PLATELET # BLD AUTO: 289 THOUSANDS/UL (ref 149–390)
PMV BLD AUTO: 10.7 FL (ref 8.9–12.7)
POTASSIUM SERPL-SCNC: 3.6 MMOL/L (ref 3.5–5.3)
PROT SERPL-MCNC: 7.2 G/DL (ref 6.4–8.2)
PROT UR STRIP-MCNC: ABNORMAL MG/DL
PROTHROMBIN TIME: 12.9 SECONDS (ref 11.6–14.5)
RBC # BLD AUTO: 4.95 MILLION/UL (ref 3.81–5.12)
RBC #/AREA URNS AUTO: ABNORMAL /HPF
SODIUM SERPL-SCNC: 141 MMOL/L (ref 136–145)
SP GR UR STRIP.AUTO: 1.02 (ref 1–1.03)
UROBILINOGEN UR QL STRIP.AUTO: 0.2 E.U./DL
WBC # BLD AUTO: 8.72 THOUSAND/UL (ref 4.31–10.16)
WBC #/AREA URNS AUTO: ABNORMAL /HPF

## 2022-06-02 PROCEDURE — 85025 COMPLETE CBC W/AUTO DIFF WBC: CPT | Performed by: EMERGENCY MEDICINE

## 2022-06-02 PROCEDURE — 99284 EMERGENCY DEPT VISIT MOD MDM: CPT

## 2022-06-02 PROCEDURE — G1004 CDSM NDSC: HCPCS

## 2022-06-02 PROCEDURE — 85610 PROTHROMBIN TIME: CPT | Performed by: EMERGENCY MEDICINE

## 2022-06-02 PROCEDURE — 36415 COLL VENOUS BLD VENIPUNCTURE: CPT | Performed by: EMERGENCY MEDICINE

## 2022-06-02 PROCEDURE — 74176 CT ABD & PELVIS W/O CONTRAST: CPT

## 2022-06-02 PROCEDURE — 83690 ASSAY OF LIPASE: CPT | Performed by: EMERGENCY MEDICINE

## 2022-06-02 PROCEDURE — 81025 URINE PREGNANCY TEST: CPT | Performed by: EMERGENCY MEDICINE

## 2022-06-02 PROCEDURE — 80053 COMPREHEN METABOLIC PANEL: CPT | Performed by: EMERGENCY MEDICINE

## 2022-06-02 PROCEDURE — 93005 ELECTROCARDIOGRAM TRACING: CPT

## 2022-06-02 PROCEDURE — 85730 THROMBOPLASTIN TIME PARTIAL: CPT | Performed by: EMERGENCY MEDICINE

## 2022-06-02 PROCEDURE — 81001 URINALYSIS AUTO W/SCOPE: CPT | Performed by: EMERGENCY MEDICINE

## 2022-06-02 PROCEDURE — 99285 EMERGENCY DEPT VISIT HI MDM: CPT | Performed by: EMERGENCY MEDICINE

## 2022-06-02 PROCEDURE — 85379 FIBRIN DEGRADATION QUANT: CPT | Performed by: EMERGENCY MEDICINE

## 2022-06-02 PROCEDURE — 96374 THER/PROPH/DIAG INJ IV PUSH: CPT

## 2022-06-02 RX ORDER — KETOROLAC TROMETHAMINE 30 MG/ML
30 INJECTION, SOLUTION INTRAMUSCULAR; INTRAVENOUS ONCE
Status: COMPLETED | OUTPATIENT
Start: 2022-06-02 | End: 2022-06-02

## 2022-06-02 RX ADMIN — KETOROLAC TROMETHAMINE 30 MG: 30 INJECTION, SOLUTION INTRAMUSCULAR at 18:23

## 2022-06-02 NOTE — Clinical Note
Tanikadirk Gamez was seen and treated in our emergency department on 6/2/2022  Diagnosis:     Zaki Max  may return to work on return date  She may return on this date: 06/06/2022         If you have any questions or concerns, please don't hesitate to call        Selvin Rangel DO    ______________________________           _______________          _______________  Hospital Representative                              Date                                Time

## 2022-06-02 NOTE — ED PROVIDER NOTES
History  Chief Complaint   Patient presents with    Abdominal Pain     Pt reports bilateral upper abd  Pain since January, denies n/v/d  Patient past medical history polycystic ovary syndrome and anxiety complains of intermittent left upper abdominal pain about 6 months now  She has tried Tylenol and Motrin without relief  The pain now is more constant since last night radiating to the right side into her left upper back  No injuries  No numbness or tingling no nausea or vomiting no diarrhea or constipation  No urinary frequency burning or pain  Patient has some shortness of breath  None       Past Medical History:   Diagnosis Date    Anxiety     Depression     PCOS (polycystic ovarian syndrome)     Polycystic ovary syndrome     Varicella     vaccinated       Past Surgical History:   Procedure Laterality Date    WISDOM TOOTH EXTRACTION         Family History   Problem Relation Age of Onset    Depression Mother     Bipolar disorder Mother     Alcohol abuse Mother         sober X 13 years    Depression Family     Anxiety disorder Family     Diabetes Family     Migraines Family     Asthma Father     Alcohol abuse Father     Ovarian cysts Sister     Anxiety disorder Sister     Crohn's disease Maternal Grandmother     Substance Abuse Maternal Uncle         OD heroin and fentanyl    Substance Abuse Paternal Uncle     ADD / ADHD Half-Brother      I have reviewed and agree with the history as documented  E-Cigarette/Vaping    E-Cigarette Use Never User      E-Cigarette/Vaping Substances    Nicotine No     THC No     CBD No     Flavoring No     Other No     Unknown No      Social History     Tobacco Use    Smoking status: Never Smoker    Smokeless tobacco: Never Used   Vaping Use    Vaping Use: Never used   Substance Use Topics    Alcohol use:  Yes     Alcohol/week: 0 0 standard drinks     Comment: social    Drug use: Not Currently     Types: Marijuana     Comment: socially in past        Review of Systems   Constitutional: Negative for chills and fever  HENT: Negative for rhinorrhea and sore throat  Respiratory: Negative for shortness of breath  Cardiovascular: Negative for chest pain  Gastrointestinal: Positive for abdominal pain  Negative for constipation, diarrhea, nausea and vomiting  Genitourinary: Negative for dysuria and frequency  Skin: Negative for rash  All other systems reviewed and are negative  Physical Exam  Physical Exam  Vitals and nursing note reviewed  Constitutional:       Appearance: She is well-developed  HENT:      Head: Normocephalic and atraumatic  Right Ear: External ear normal       Left Ear: External ear normal       Nose: Nose normal    Eyes:      Conjunctiva/sclera: Conjunctivae normal       Pupils: Pupils are equal, round, and reactive to light  Cardiovascular:      Rate and Rhythm: Normal rate and regular rhythm  Heart sounds: Normal heart sounds  Pulmonary:      Effort: Pulmonary effort is normal  No respiratory distress  Breath sounds: Normal breath sounds  No wheezing  Chest:      Chest wall: Tenderness present  Abdominal:      General: Bowel sounds are normal  There is no distension  Palpations: Abdomen is soft  Tenderness: There is abdominal tenderness in the right upper quadrant, epigastric area and left upper quadrant  There is no right CVA tenderness, left CVA tenderness, guarding or rebound  Musculoskeletal:         General: No deformity  Normal range of motion  Cervical back: Normal range of motion and neck supple  No spinous process tenderness  Skin:     General: Skin is warm and dry  Findings: No rash  Neurological:      General: No focal deficit present  Mental Status: She is alert  GCS: GCS eye subscore is 4  GCS verbal subscore is 5  GCS motor subscore is 6  Sensory: No sensory deficit     Psychiatric:         Mood and Affect: Mood normal  Vital Signs  ED Triage Vitals   Temperature Pulse Respirations Blood Pressure SpO2   06/02/22 1546 06/02/22 1546 06/02/22 1546 06/02/22 1546 06/02/22 1704   98 1 °F (36 7 °C) 92 16 121/70 96 %      Temp Source Heart Rate Source Patient Position - Orthostatic VS BP Location FiO2 (%)   06/02/22 1546 06/02/22 1546 06/02/22 1546 06/02/22 1546 --   Temporal Monitor Sitting Left arm       Pain Score       06/02/22 1546       8           Vitals:    06/02/22 1704 06/02/22 1745 06/02/22 1848 06/02/22 1905   BP: 116/62 120/65 107/56 112/70   Pulse: 77 77 77 72   Patient Position - Orthostatic VS:    Sitting         Visual Acuity      ED Medications  Medications   ketorolac (TORADOL) injection 30 mg (30 mg Intravenous Given 6/2/22 1823)       Diagnostic Studies  Results Reviewed     Procedure Component Value Units Date/Time    Urine Microscopic [[id number]]  (Abnormal) Collected: 06/02/22 1709    Lab Status: Final result Specimen: Urine, Clean Catch Updated: 06/02/22 1831     RBC, UA 0-1 /hpf      WBC, UA 4-10 /hpf      Epithelial Cells Moderate /hpf      Bacteria, UA Moderate /hpf      MUCUS THREADS None Seen    Comprehensive metabolic panel [[id number]] Collected: 06/02/22 1709    Lab Status: Final result Specimen: Blood from Arm, Left Updated: 06/02/22 1756     Sodium 141 mmol/L      Potassium 3 6 mmol/L      Chloride 106 mmol/L      CO2 29 mmol/L      ANION GAP 6 mmol/L      BUN 9 mg/dL      Creatinine 0 87 mg/dL      Glucose 107 mg/dL      Calcium 9 1 mg/dL      AST 27 U/L      ALT 20 U/L      Alkaline Phosphatase 78 U/L      Total Protein 7 2 g/dL      Albumin 3 9 g/dL      Total Bilirubin 0 30 mg/dL      eGFR 94 ml/min/1 73sq m     Narrative:      Bassam guidelines for Chronic Kidney Disease (CKD):     Stage 1 with normal or high GFR (GFR > 90 mL/min/1 73 square meters)    Stage 2 Mild CKD (GFR = 60-89 mL/min/1 73 square meters)    Stage 3A Moderate CKD (GFR = 45-59 mL/min/1 73 square meters)    Stage 3B Moderate CKD (GFR = 30-44 mL/min/1 73 square meters)    Stage 4 Severe CKD (GFR = 15-29 mL/min/1 73 square meters)    Stage 5 End Stage CKD (GFR <15 mL/min/1 73 square meters)  Note: GFR calculation is accurate only with a steady state creatinine    Lipase [[id number]]  (Normal) Collected: 06/02/22 1709    Lab Status: Final result Specimen: Blood from Arm, Left Updated: 06/02/22 1756     Lipase 126 u/L     UA w Reflex to Microscopic w Reflex to Culture [[id number]]  (Abnormal) Collected: 06/02/22 1709    Lab Status: Final result Specimen: Urine, Clean Catch Updated: 06/02/22 1754     Color, UA Yellow     Clarity, UA Slightly Cloudy     Specific Gravity, UA 1 025     pH, UA 5 5     Leukocytes, UA Trace     Nitrite, UA Negative     Protein, UA Trace mg/dl      Glucose, UA Negative mg/dl      Ketones, UA Negative mg/dl      Urobilinogen, UA 0 2 E U /dl      Bilirubin, UA Negative     Blood, UA Large    D-dimer, quantitative [[id number]]  (Normal) Collected: 06/02/22 1709    Lab Status: Final result Specimen: Blood from Arm, Left Updated: 06/02/22 1734     D-Dimer, Quant 0 38 ug/ml FEU     APTT [[id number]]  (Normal) Collected: 06/02/22 1709    Lab Status: Final result Specimen: Blood from Arm, Left Updated: 06/02/22 1730     PTT 30 seconds     Protime-INR [[id number]]  (Normal) Collected: 06/02/22 1709    Lab Status: Final result Specimen: Blood from Arm, Left Updated: 06/02/22 1730     Protime 12 9 seconds      INR 0 98    CBC and differential [[id number]] Collected: 06/02/22 1709    Lab Status: Final result Specimen: Blood from Arm, Left Updated: 06/02/22 1718     WBC 8 72 Thousand/uL      RBC 4 95 Million/uL      Hemoglobin 13 4 g/dL      Hematocrit 41 2 %      MCV 83 fL      MCH 27 1 pg      MCHC 32 5 g/dL      RDW 13 1 %      MPV 10 7 fL      Platelets 588 Thousands/uL      nRBC 0 /100 WBCs      Neutrophils Relative 70 %      Immat GRANS % 0 %      Lymphocytes Relative 22 %      Monocytes Relative 8 %      Eosinophils Relative 0 %      Basophils Relative 0 %      Neutrophils Absolute 6 06 Thousands/µL      Immature Grans Absolute 0 02 Thousand/uL      Lymphocytes Absolute 1 92 Thousands/µL      Monocytes Absolute 0 66 Thousand/µL      Eosinophils Absolute 0 03 Thousand/µL      Basophils Absolute 0 03 Thousands/µL     POCT pregnancy, urine [[id number]]  (Normal) Resulted: 06/02/22 1718    Lab Status: Final result Specimen: Urine Updated: 06/02/22 1718     EXT PREG TEST UR (Ref: Negative) Negative     Control Valid                 CT abdomen pelvis wo contrast   Final Result by Kristofer Martinez MD (06/02 1812)      Nonobstructing calculi in the right lower pole measuring 5 and 4 mm  No hydronephrosis on either side  Workstation performed: [id number]                    Procedures  ECG 12 Lead Documentation Only    Date/Time: 6/2/2022 10:50 PM  Performed by: Nan Mckeon DO  Authorized by: Nan Mckeon DO     Indications / Diagnosis:  Left chest pain  ECG reviewed by me, the ED Provider: yes    Patient location:  ED  Previous ECG:     Previous ECG:  Unavailable  Interpretation:     Interpretation: normal    Rate:     ECG rate:  79    ECG rate assessment: normal    Rhythm:     Rhythm: sinus rhythm    Ectopy:     Ectopy: none    QRS:     QRS axis:  Normal    QRS intervals:  Normal  Conduction:     Conduction: normal    ST segments:     ST segments:  Normal  T waves:     T waves: normal               ED Course                               SBIRT 22yo+    Flowsheet Row Most Recent Value   SBIRT (25 yo +)    In order to provide better care to our patients, we are screening all of our patients for alcohol and drug use  Would it be okay to ask you these screening questions? Yes Filed at: 06/02/2022 1824   Initial Alcohol Screen: US AUDIT-C     1  How often do you have a drink containing alcohol? 1 Filed at: 06/02/2022 1824   2   How many drinks containing alcohol do you have on a typical day you are drinking? 0 Filed at: 06/02/2022 1824   3b  FEMALE Any Age, or MALE 65+: How often do you have 4 or more drinks on one occassion? 0 Filed at: 06/02/2022 1824   Audit-C Score 1 Filed at: 06/02/2022 1824   REJI: How many times in the past year have you    Used an illegal drug or used a prescription medication for non-medical reasons? Never Filed at: 06/02/2022 1824                    MDM  Number of Diagnoses or Management Options  Abdominal pain: new and requires workup     Amount and/or Complexity of Data Reviewed  Clinical lab tests: ordered and reviewed  Tests in the radiology section of CPT®: ordered and reviewed    Patient Progress  Patient progress: improved      Disposition  Final diagnoses:   Abdominal pain     Time reflects when diagnosis was documented in both MDM as applicable and the Disposition within this note     Time User Action Codes Description Comment    6/2/2022  6:39 PM Max Blood Add [R10 9] Abdominal pain       ED Disposition     ED Disposition   Discharge    Condition   Stable    Date/Time   Thu Jun 2, 2022  6:39 PM    Comment   Mack Singh discharge to home/self care  Follow-up Information     Follow up With Specialties Details Why [id number] [facility]   Jaimie Yap MD Family Medicine Call   [address] [place] [id number]  [phone]            Discharge Medication List as of 6/2/2022  7:04 PM      START taking these medications    Details   Simethicone 80 MG TABS Take 1 tablet (80 mg total) by mouth 4 (four) times a day as needed (abdominal pain), Starting Thu 6/2/2022, Normal             No discharge procedures on file      PDMP Review     None          ED Provider  Electronically Signed by           Emiliana Meng DO  06/02/22 9899

## 2022-06-06 LAB
ATRIAL RATE: 79 BPM
P AXIS: 18 DEGREES
PR INTERVAL: 120 MS
QRS AXIS: 51 DEGREES
QRSD INTERVAL: 82 MS
QT INTERVAL: 370 MS
QTC INTERVAL: 424 MS
T WAVE AXIS: 28 DEGREES
VENTRICULAR RATE: 79 BPM

## 2022-06-06 PROCEDURE — 93010 ELECTROCARDIOGRAM REPORT: CPT | Performed by: INTERNAL MEDICINE

## 2022-06-14 ENCOUNTER — TELEPHONE (OUTPATIENT)
Dept: DERMATOLOGY | Facility: CLINIC | Age: 22
End: 2022-06-14

## 2022-07-20 ENCOUNTER — ANNUAL EXAM (OUTPATIENT)
Dept: OBGYN CLINIC | Facility: CLINIC | Age: 22
End: 2022-07-20
Payer: COMMERCIAL

## 2022-07-20 VITALS
BODY MASS INDEX: 38.51 KG/M2 | HEIGHT: 61 IN | WEIGHT: 204 LBS | DIASTOLIC BLOOD PRESSURE: 72 MMHG | SYSTOLIC BLOOD PRESSURE: 110 MMHG

## 2022-07-20 DIAGNOSIS — Z01.419 WELL WOMAN EXAM WITH ROUTINE GYNECOLOGICAL EXAM: Primary | ICD-10-CM

## 2022-07-20 DIAGNOSIS — Z12.4 ENCOUNTER FOR SCREENING FOR MALIGNANT NEOPLASM OF CERVIX: ICD-10-CM

## 2022-07-20 DIAGNOSIS — E28.2 PCOS (POLYCYSTIC OVARIAN SYNDROME): ICD-10-CM

## 2022-07-20 DIAGNOSIS — Z30.41 ENCOUNTER FOR SURVEILLANCE OF CONTRACEPTIVE PILLS: ICD-10-CM

## 2022-07-20 PROCEDURE — S0612 ANNUAL GYNECOLOGICAL EXAMINA: HCPCS | Performed by: OBSTETRICS & GYNECOLOGY

## 2022-07-20 PROCEDURE — G0145 SCR C/V CYTO,THINLAYER,RESCR: HCPCS | Performed by: OBSTETRICS & GYNECOLOGY

## 2022-07-20 RX ORDER — NORETHINDRONE ACETATE AND ETHINYL ESTRADIOL AND FERROUS FUMARATE 1MG-20(24)
1 KIT ORAL DAILY
Qty: 84 TABLET | Refills: 3 | Status: SHIPPED | OUTPATIENT
Start: 2022-07-20 | End: 2022-08-24

## 2022-07-20 NOTE — PATIENT INSTRUCTIONS
How the Pill Works    The pill works primarily by stopping ovulation (release of an egg)  Pills are very effective if swallowed at the same time every day and if other instructions regarding concurrent drug use or use during episodes of diarrhea or vomiting are followed  In addition to preventing pregnancy, pills decrease your risk for ovarian cancer, cancer of the lining of the uterus, benign breast masses, and ovarian cysts  Pills decrease menstrual blood loss and menstrual cramps  Starting the Pill    If you are beginning birth control following a pregnancy, you should begin the day of or the day after your , delivery or miscarriage  Otherwise, you may start the  following the beginning of your menstrual cycle  They will be effective after seven continuous days of use  Take one pill a day until you finish the pack, then:   -If your are using a 28-day pack, begin a new pack immediately  Do not skip any days between packs  -If you are using a 21-day pack, stop taking pills for 1 week then start your new pack  Spotting  If you have spotting/bleeding between periods try to take the pill at the same time every day  Spotting will sometimes stop on its own after your body gets used to the pill  If the spotting doesn't stop on its own after 3 cycles, you can come back to the office for a change in your prescription  Missing your Pill  If you forget to take yesterday's pill, take it as soon as you remember and take today's pill at the regular time  The risk of getting pregnant is slight, but you can use a second method of birth control just to be sure  If you miss two pills in a row, take two pills as soon as you remember and two again the next day  You may have some spotting  It is recommended that you use a second method of birth control along with the pills until your next period  If you miss three or more pills in a row, take two pills a day for three days   It is strongly advised that you also use a second method of birth control until your next period  OR  Stop taking the pills from your old pack of pills  Start a new pack of pills the next Sunday (even if you are bleeding)  Use a second method of birth control while you are off pills and for the first two weeks that you are on your new pack  Missing a Period  If you miss a period but you have not missed any pills and you have no signs of pregnancy, it is unlikely that you are pregnant  If you are worried, you can come into the clinic for a pregnancy test  Many women who take the birth control pill miss a period every now and then  If you do become pregnant while on the pill, the risk of having a child with birth defects is slightly increased by taking pills in the first month or two of pregnancy  The magnitude of this risk is not yet defined  Side Effect  You may have few temporary side effects while taking the pills such as nausea, breast tenderness, slight weight gain, bloating or break-through bleeding, usually these will be lessened after you have taken the pill for 2-3 months  You may experience pronounced mood changes while taking the pill, such as depression, irritability, and a change in sex drive  Sometimes switching pill brands can help this  Warning Signs  Be familiar with the pills warning signs:   Severe abdominal pain   Severe chest pain, cough, shortness of breath   Severe headache, dizziness, weakness, or numbness   Eye problems (vision loss or blurring)   Speech problems   Severe leg pain (calf or thigh)    Follow Up  We will typically schedule you to return to the office in 3 months after starting the birth control pill  Usually by 3 months, irregular bleeding and side effects will have resolved  We would also like to check your blood pressure at this visit as birth control pills can increase blood pressure  After your 3 month checkup, annual follow-up is then recommended      Please do not hesitate to contact the office at [phone] with any questions

## 2022-07-20 NOTE — PROGRESS NOTES
ASSESSMENT & PLAN:   Diagnoses and all orders for this visit:    Well woman exam with routine gynecological exam  -     Liquid-based pap, screening    Encounter for screening for malignant neoplasm of cervix  -     Liquid-based pap, screening    PCOS (polycystic ovarian syndrome)  -     norethindrone-ethinyl estradiol-ferrous fumarate (Junel Fe 24) 1-20 MG-MCG(24) per tablet; Take 1 tablet by mouth daily    Encounter for surveillance of contraceptive pills  -     norethindrone-ethinyl estradiol-ferrous fumarate (Junel Fe 24) 1-20 MG-MCG(24) per tablet; Take 1 tablet by mouth daily          The following were reviewed in today's visit: ASCCP guidelines, Gardisil vaccination, STD testing breast self exam, use and side effects of OCPs, family planning choices, exercise and healthy diet  Patient to return to office in yearly for annual exam      All questions have been answered to her satisfaction  CC:  Annual Gynecologic Examination  Chief Complaint   Patient presents with    Gynecologic Exam     Patient is here today for her yearly exam, pap due today, mammo/dexa/colonoscopy not indicated  Patient would like to discuss her PCOS and her constant bleeding  HPI: Patsy Kelley is a 25 y o  Rekha Lucie who presents for annual gynecologic examination  She has the following concerns:      1  PCOS  She spots and bleeds a lot  She had no bleeding January to March  Bleeding does get heavy at times, like a period but otherwise is heavy spotting  She was on the [address] last year but she did not like it so stopped using it  Discussed diet/exercise/weight loss  Patient is interested in OCPs for control of cycle  Safe and effective use discussed         Health Maintenance:    Exercise: occasionally  Breast exams/breast awareness: always feel sore  Diet: could be better      Past Medical History:   Diagnosis Date    Anxiety     Depression     PCOS (polycystic ovarian syndrome)     Polycystic ovary syndrome     Varicella     vaccinated       Past Surgical History:   Procedure Laterality Date    WISDOM TOOTH EXTRACTION         Past OB/Gyn History:   No LMP recorded (approximate)  Last Pap: never had a pap  HPV vaccine completed: yes    Patient is currently sexually active  STD testing: no  Current contraception: condoms  - most of the time  Family History  Family History   Problem Relation Age of Onset    Depression Mother     Bipolar disorder Mother     Alcohol abuse Mother         sober X 13 years   [facility] Ast[facility] Father     Alcohol abuse Father     Ovarian cysts Sister     Anxiety disorder Sister     Crohn's disease Maternal Grandmother     Substance Abuse Maternal Uncle         OD heroin and fentanyl    Substance Abuse Paternal Uncle     Depression Family     Anxiety disorder Family     Diabetes Family     Migraines Family     ADD / ADHD Half-Brother        Family history of uterine or ovarian cancer: no  Family history of breast cancer: no  Family history of colon cancer: no    Social History:  Social History     Socioeconomic History    Marital status: Single     Spouse name: Not on file    Number of children: 0    Years of education: Not on file    Highest education level: High school graduate   Occupational History    Occupation: [employer] in [employer]:     Tobacco Use    Smoking status: Never Smoker    Smokeless tobacco: Never Used   Vaping Use    Vaping Use: Never used   Substance and Sexual Activity    Alcohol use:  Yes     Alcohol/week: 0 0 standard drinks     Comment: social    Drug use: Not Currently     Types: Marijuana     Comment: socially in past     Sexual activity: Yes     Partners: Male     Birth control/protection: Condom Male   Other Topics Concern    Not on file   Social History Narrative    Recreational activities; running     Social Determinants of Health     Financial Resource Strain: Not on file   Food Insecurity: Not on file   Transportation Needs: Not on file   Physical Activity: Not on file   Stress: Not on file   Social Connections: Not on file   Intimate Partner Violence: Not on file   Housing Stability: Not on file     Domestic violence screen: negative    Allergies:  No Known Allergies    Medications:    Current Outpatient Medications:     norethindrone-ethinyl estradiol-ferrous fumarate (Junel Fe 24) 1-20 MG-MCG(24) per tablet, Take 1 tablet by mouth daily, Disp: 84 tablet, Rfl: 3    Review of Systems:  Review of Systems   Constitutional: Negative for chills and fever  Respiratory: Negative for cough and shortness of breath  Cardiovascular: Negative for chest pain and palpitations  Gastrointestinal: Positive for abdominal pain (kidney stones)  Negative for abdominal distention, blood in stool, constipation, nausea and vomiting  Genitourinary: Positive for dysuria (once in a while), menstrual problem and vaginal bleeding  Negative for difficulty urinating, dyspareunia, frequency, pelvic pain, urgency, vaginal discharge and vaginal pain  Neurological: Negative for headaches  Physical Exam:  /72 (BP Location: Right arm, Patient Position: Sitting, Cuff Size: Standard)   Ht 5' 1" (1 549 m)   Wt 92 5 kg (204 lb)   LMP  (Approximate) Comment: sometime in March 2022  BMI 38 55 kg/m²    Physical Exam  Constitutional:       General: She is awake  Appearance: Normal appearance  She is well-developed  Genitourinary:      Vulva, bladder and urethral meatus normal       Right Labia: No rash, tenderness or lesions  Left Labia: No tenderness, lesions or rash  No labial fusion noted  No vaginal discharge, erythema, tenderness or bleeding  No vaginal prolapse present  No vaginal atrophy present  Right Adnexa: not tender, not full and no mass present  Left Adnexa: not tender, not full and no mass present  Cervix is nulliparous        No cervical motion tenderness, discharge, lesion or polyp  Uterus is not enlarged, fixed, tender or irregular  No uterine mass detected  No urethral prolapse present  Bladder is not tender  Pelvic exam was performed with patient in the lithotomy position  Breasts:      Right: No inverted nipple, mass, nipple discharge, skin change, tenderness, axillary adenopathy or supraclavicular adenopathy  Left: No inverted nipple, mass, nipple discharge, skin change, tenderness, axillary adenopathy or supraclavicular adenopathy  HENT:      Head: Normocephalic and atraumatic  Cardiovascular:      Rate and Rhythm: Normal rate and regular rhythm  Heart sounds: Normal heart sounds  Pulmonary:      Effort: Pulmonary effort is normal  No tachypnea or respiratory distress  Breath sounds: Normal breath sounds  Abdominal:      General: Abdomen is flat  There is no distension  Palpations: Abdomen is soft  Tenderness: There is no abdominal tenderness  There is no guarding or rebound  Musculoskeletal:      Cervical back: Neck supple  Lymphadenopathy:      Upper Body:      Right upper body: No supraclavicular or axillary adenopathy  Left upper body: No supraclavicular or axillary adenopathy  Neurological:      General: No focal deficit present  Mental Status: She is alert and oriented to person, place, and time  Psychiatric:         Mood and Affect: Mood normal          Behavior: Behavior normal          Thought Content: Thought content normal          Judgment: Judgment normal    Vitals reviewed

## 2022-07-26 LAB
LAB AP GYN PRIMARY INTERPRETATION: NORMAL
Lab: NORMAL
PATH INTERP SPEC-IMP: NORMAL

## 2022-07-27 DIAGNOSIS — N76.0 BACTERIAL VAGINOSIS: Primary | ICD-10-CM

## 2022-07-27 DIAGNOSIS — B96.89 BACTERIAL VAGINOSIS: Primary | ICD-10-CM

## 2022-07-27 RX ORDER — METRONIDAZOLE 500 MG/1
500 TABLET ORAL 2 TIMES DAILY
Qty: 14 TABLET | Refills: 0 | Status: SHIPPED | OUTPATIENT
Start: 2022-07-27 | End: 2022-08-03

## 2022-08-06 ENCOUNTER — PATIENT MESSAGE (OUTPATIENT)
Dept: OBGYN CLINIC | Facility: CLINIC | Age: 22
End: 2022-08-06

## 2022-08-08 ENCOUNTER — HOSPITAL ENCOUNTER (EMERGENCY)
Facility: HOSPITAL | Age: 22
Discharge: HOME | End: 2022-08-08
Attending: EMERGENCY MEDICINE
Payer: COMMERCIAL

## 2022-08-08 VITALS
OXYGEN SATURATION: 97 % | HEIGHT: 61 IN | RESPIRATION RATE: 16 BRPM | SYSTOLIC BLOOD PRESSURE: 121 MMHG | BODY MASS INDEX: 38.51 KG/M2 | TEMPERATURE: 97.7 F | WEIGHT: 204 LBS | DIASTOLIC BLOOD PRESSURE: 74 MMHG | HEART RATE: 68 BPM

## 2022-08-08 DIAGNOSIS — N93.9 VAGINAL BLEEDING: Primary | ICD-10-CM

## 2022-08-08 LAB
ALBUMIN SERPL BCP-MCNC: 3.7 G/DL (ref 3.5–5)
ALP SERPL-CCNC: 67 U/L (ref 46–116)
ALT SERPL W P-5'-P-CCNC: 20 U/L (ref 12–78)
ANION GAP SERPL CALCULATED.3IONS-SCNC: 7 MMOL/L (ref 4–13)
AST SERPL W P-5'-P-CCNC: 12 U/L (ref 5–45)
BASOPHILS # BLD AUTO: 0.02 THOUSANDS/ΜL (ref 0–0.1)
BASOPHILS NFR BLD AUTO: 0 % (ref 0–1)
BILIRUB SERPL-MCNC: 0.1 MG/DL (ref 0.2–1)
BUN SERPL-MCNC: 10 MG/DL (ref 5–25)
CALCIUM SERPL-MCNC: 9.1 MG/DL (ref 8.3–10.1)
CHLORIDE SERPL-SCNC: 105 MMOL/L (ref 96–108)
CO2 SERPL-SCNC: 29 MMOL/L (ref 21–32)
CREAT SERPL-MCNC: 0.89 MG/DL (ref 0.6–1.3)
EOSINOPHIL # BLD AUTO: 0.07 THOUSAND/ΜL (ref 0–0.61)
EOSINOPHIL NFR BLD AUTO: 1 % (ref 0–6)
ERYTHROCYTE [DISTWIDTH] IN BLOOD BY AUTOMATED COUNT: 12.8 % (ref 11.6–15.1)
EXT PREG TEST URINE: NEGATIVE
EXT. CONTROL ED NAV: NORMAL
GFR SERPL CREATININE-BSD FRML MDRD: 92 ML/MIN/1.73SQ M
GLUCOSE SERPL-MCNC: 93 MG/DL (ref 65–140)
HCT VFR BLD AUTO: 36.6 % (ref 34.8–46.1)
HGB BLD-MCNC: 12 G/DL (ref 11.5–15.4)
IMM GRANULOCYTES # BLD AUTO: 0.02 THOUSAND/UL (ref 0–0.2)
IMM GRANULOCYTES NFR BLD AUTO: 0 % (ref 0–2)
LIPASE SERPL-CCNC: 202 U/L (ref 73–393)
LYMPHOCYTES # BLD AUTO: 2.49 THOUSANDS/ΜL (ref 0.6–4.47)
LYMPHOCYTES NFR BLD AUTO: 30 % (ref 14–44)
MCH RBC QN AUTO: 27.8 PG (ref 26.8–34.3)
MCHC RBC AUTO-ENTMCNC: 32.8 G/DL (ref 31.4–37.4)
MCV RBC AUTO: 85 FL (ref 82–98)
MONOCYTES # BLD AUTO: 0.61 THOUSAND/ΜL (ref 0.17–1.22)
MONOCYTES NFR BLD AUTO: 7 % (ref 4–12)
NEUTROPHILS # BLD AUTO: 5.14 THOUSANDS/ΜL (ref 1.85–7.62)
NEUTS SEG NFR BLD AUTO: 62 % (ref 43–75)
NRBC BLD AUTO-RTO: 0 /100 WBCS
PLATELET # BLD AUTO: 279 THOUSANDS/UL (ref 149–390)
PMV BLD AUTO: 10.4 FL (ref 8.9–12.7)
POTASSIUM SERPL-SCNC: 4 MMOL/L (ref 3.5–5.3)
PROT SERPL-MCNC: 7.4 G/DL (ref 6.4–8.4)
RBC # BLD AUTO: 4.32 MILLION/UL (ref 3.81–5.12)
SODIUM SERPL-SCNC: 141 MMOL/L (ref 135–147)
WBC # BLD AUTO: 8.35 THOUSAND/UL (ref 4.31–10.16)

## 2022-08-08 PROCEDURE — 86900 BLOOD TYPING SEROLOGIC ABO: CPT | Performed by: EMERGENCY MEDICINE

## 2022-08-08 PROCEDURE — 85025 COMPLETE CBC W/AUTO DIFF WBC: CPT | Performed by: EMERGENCY MEDICINE

## 2022-08-08 PROCEDURE — 36415 COLL VENOUS BLD VENIPUNCTURE: CPT | Performed by: EMERGENCY MEDICINE

## 2022-08-08 PROCEDURE — 99284 EMERGENCY DEPT VISIT MOD MDM: CPT

## 2022-08-08 PROCEDURE — 86901 BLOOD TYPING SEROLOGIC RH(D): CPT | Performed by: EMERGENCY MEDICINE

## 2022-08-08 PROCEDURE — 80053 COMPREHEN METABOLIC PANEL: CPT | Performed by: EMERGENCY MEDICINE

## 2022-08-08 PROCEDURE — 87491 CHLMYD TRACH DNA AMP PROBE: CPT | Performed by: EMERGENCY MEDICINE

## 2022-08-08 PROCEDURE — 81025 URINE PREGNANCY TEST: CPT | Performed by: EMERGENCY MEDICINE

## 2022-08-08 PROCEDURE — 83690 ASSAY OF LIPASE: CPT | Performed by: EMERGENCY MEDICINE

## 2022-08-08 PROCEDURE — 99284 EMERGENCY DEPT VISIT MOD MDM: CPT | Performed by: EMERGENCY MEDICINE

## 2022-08-08 PROCEDURE — 87591 N.GONORRHOEAE DNA AMP PROB: CPT | Performed by: EMERGENCY MEDICINE

## 2022-08-08 PROCEDURE — 86850 RBC ANTIBODY SCREEN: CPT | Performed by: EMERGENCY MEDICINE

## 2022-08-09 LAB
ABO GROUP BLD: NORMAL
ABO GROUP BLD: NORMAL
BLD GP AB SCN SERPL QL: NEGATIVE
C TRACH DNA SPEC QL NAA+PROBE: NEGATIVE
N GONORRHOEA DNA SPEC QL NAA+PROBE: NEGATIVE
RH BLD: NEGATIVE
RH BLD: NEGATIVE
SPECIMEN EXPIRATION DATE: NORMAL

## 2022-08-09 NOTE — ED PROVIDER NOTES
History  Chief Complaint   Patient presents with    Vaginal Bleeding      This is a 59-year-old female who was started on Jocelin 24,   3 weeks ago and started with heavy vaginal bleeding using 3 pads per hour complains of some generalized weakness and lightheadedness and crampy lower abdominal pain  History provided by:  Patient  Medical Problem  Location:   vaginal  Quality:   bleeding  Severity:  Severe  Onset quality:  Unable to specify  Duration:  3 weeks  Timing:  Constant  Progression:  Unchanged  Chronicity:  New  Context:   vaginal bleeding  Worsened by:   new birth control  Associated symptoms: abdominal pain        Prior to Admission Medications   Prescriptions Last Dose Informant Patient Reported? Taking?   norethindrone-ethinyl estradiol-ferrous fumarate (Junel Fe 24) 1-20 MG-MCG(24) per tablet   No No   Sig: Take 1 tablet by mouth daily      Facility-Administered Medications: None       Past Medical History:   Diagnosis Date    Anxiety     Depression     PCOS (polycystic ovarian syndrome)     Polycystic ovary syndrome     Varicella     vaccinated       Past Surgical History:   Procedure Laterality Date    WISDOM TOOTH EXTRACTION         Family History   Problem Relation Age of Onset    Depression Mother     Bipolar disorder Mother     Alcohol abuse Mother         sober X 13 years   [facility] Asthma Father     Alcohol abuse Father     Ovarian cysts Sister     Anxiety disorder Sister     Crohn's disease Maternal Grandmother     Substance Abuse Maternal Uncle         OD heroin and fentanyl    Substance Abuse Paternal Uncle     Depression Family     Anxiety disorder Family     Diabetes Family     Migraines Family     ADD / ADHD Half-Brother      I have reviewed and agree with the history as documented      E-Cigarette/Vaping    E-Cigarette Use Never User      E-Cigarette/Vaping Substances    Nicotine No     THC No     CBD No     Flavoring No     Other No     Unknown No      Social History     Tobacco Use    Smoking status: Never Smoker    Smokeless tobacco: Never Used   Vaping Use    Vaping Use: Never used   Substance Use Topics    Alcohol use: Yes     Alcohol/week: 0 0 standard drinks     Comment: social    Drug use: Not Currently     Types: Marijuana     Comment: socially in past        Review of Systems   Gastrointestinal: Positive for abdominal pain  Genitourinary: Positive for vaginal bleeding  Neurological: Positive for light-headedness  All other systems reviewed and are negative  Physical Exam  Physical Exam  Vitals and nursing note reviewed  Constitutional:       General: She is not in acute distress  Appearance: She is not ill-appearing, toxic-appearing or diaphoretic  HENT:      Head: Normocephalic and atraumatic  Right Ear: External ear normal       Left Ear: External ear normal       Nose: Nose normal    Eyes:      General: No scleral icterus  Right eye: No discharge  Left eye: No discharge  Extraocular Movements: Extraocular movements intact  Pupils: Pupils are equal, round, and reactive to light  Cardiovascular:      Rate and Rhythm: Normal rate and regular rhythm  Pulses: Normal pulses  Heart sounds: No murmur heard  No friction rub  No gallop  Pulmonary:      Effort: Pulmonary effort is normal  No respiratory distress  Breath sounds: No stridor  No wheezing, rhonchi or rales  Abdominal:      General: There is no distension  Palpations: Abdomen is soft  Tenderness: There is no abdominal tenderness  There is no guarding or rebound  Genitourinary:     Comments: Pelvic exam shows small amount of bleeding and clots no brisk hemorrhaging  Musculoskeletal:         General: No swelling, tenderness, deformity or signs of injury  Normal range of motion  Cervical back: Normal range of motion and neck supple  No rigidity or tenderness  Right lower leg: No edema        Left lower leg: No edema    Skin:     General: Skin is warm and dry  Coloration: Skin is not jaundiced  Findings: No bruising, erythema or rash  Neurological:      General: No focal deficit present  Mental Status: She is alert and oriented to person, place, and time  Cranial Nerves: No cranial nerve deficit  Sensory: No sensory deficit  Coordination: Coordination normal    Psychiatric:         Mood and Affect: Mood normal          Behavior: Behavior normal          Thought Content: Thought content normal          Vital Signs  ED Triage Vitals   Temperature Pulse Respirations Blood Pressure SpO2   08/08/22 2041 08/08/22 2041 08/08/22 2041 08/08/22 2041 08/08/22 2041   97 7 °F (36 5 °C) 84 16 121/88 97 %      Temp Source Heart Rate Source Patient Position - Orthostatic VS BP Location FiO2 (%)   08/08/22 2041 08/08/22 2130 08/08/22 2130 -- --   Temporal Monitor Sitting        Pain Score       08/08/22 2041       9           Vitals:    08/08/22 2041 08/08/22 2130 08/08/22 2200   BP: 121/88 119/70 121/74   Pulse: 84 63 68   Patient Position - Orthostatic VS:  Sitting Sitting         Visual Acuity      ED Medications  Medications - No data to display    Diagnostic Studies  Results Reviewed     Procedure Component Value Units Date/Time    Chlamydia/GC amplified DNA by PCR [[id number]] Collected: 08/08/22 2233    Lab Status:  In process Specimen: Cervix Updated: 08/08/22 2238    Comprehensive metabolic panel [[id number]]  (Abnormal) Collected: 08/08/22 2044    Lab Status: Final result Specimen: Blood from Arm, Left Updated: 08/08/22 2121     Sodium 141 mmol/L      Potassium 4 0 mmol/L      Chloride 105 mmol/L      CO2 29 mmol/L      ANION GAP 7 mmol/L      BUN 10 mg/dL      Creatinine 0 89 mg/dL      Glucose 93 mg/dL      Calcium 9 1 mg/dL      AST 12 U/L      ALT 20 U/L      Alkaline Phosphatase 67 U/L      Total Protein 7 4 g/dL      Albumin 3 7 g/dL      Total Bilirubin 0 10 mg/dL      eGFR 92 ml/min/1 73sq m Narrative:      National Kidney Disease Foundation guidelines for Chronic Kidney Disease (CKD):     Stage 1 with normal or high GFR (GFR > 90 mL/min/1 73 square meters)    Stage 2 Mild CKD (GFR = 60-89 mL/min/1 73 square meters)    Stage 3A Moderate CKD (GFR = 45-59 mL/min/1 73 square meters)    Stage 3B Moderate CKD (GFR = 30-44 mL/min/1 73 square meters)    Stage 4 Severe CKD (GFR = 15-29 mL/min/1 73 square meters)    Stage 5 End Stage CKD (GFR <15 mL/min/1 73 square meters)  Note: GFR calculation is accurate only with a steady state creatinine    Lipase [[id number]]  (Normal) Collected: 08/08/22 2044    Lab Status: Final result Specimen: Blood from Arm, Left Updated: 08/08/22 2121     Lipase 202 u/L     POCT pregnancy, urine [[id number]]  (Normal) Resulted: 08/08/22 2110    Lab Status: Final result Specimen: Urine Updated: 08/08/22 2111     EXT PREG TEST UR (Ref: Negative) Negative     Control Valid    CBC and differential [[id number]] Collected: 08/08/22 2044    Lab Status: Final result Specimen: Blood from Arm, Left Updated: 08/08/22 2050     WBC 8 35 Thousand/uL      RBC 4 32 Million/uL      Hemoglobin 12 0 g/dL      Hematocrit 36 6 %      MCV 85 fL      MCH 27 8 pg      MCHC 32 8 g/dL      RDW 12 8 %      MPV 10 4 fL      Platelets 068 Thousands/uL      nRBC 0 /100 WBCs      Neutrophils Relative 62 %      Immat GRANS % 0 %      Lymphocytes Relative 30 %      Monocytes Relative 7 %      Eosinophils Relative 1 %      Basophils Relative 0 %      Neutrophils Absolute 5 14 Thousands/µL      Immature Grans Absolute 0 02 Thousand/uL      Lymphocytes Absolute 2 49 Thousands/µL      Monocytes Absolute 0 61 Thousand/µL      Eosinophils Absolute 0 07 Thousand/µL      Basophils Absolute 0 02 Thousands/µL                  No orders to display              Procedures  Procedures         ED Course  ED Course as of 08/08/22 2247   Mon Aug 08, 2022   2245  Discussed case with OBGYN who recommends using Motrin to help decrease the bleeding and call doctor or Maxine Nails in the morning                                             MDM    Disposition  Final diagnoses:   Vaginal bleeding     Time reflects when diagnosis was documented in both MDM as applicable and the Disposition within this note     Time User Action Codes Description Comment    8/8/2022 10:31 PM Nancy Dmuont Add [N93 9] Vaginal bleeding       ED Disposition     ED Disposition   Discharge    Condition   Stable    Date/Time   Mon Aug 8, 2022 10:46 PM    Comment   [place] discharge to home/self care  Follow-up Information     Follow up With Specialties Details Why [address] [place] [address],  Obstetrics and Gynecology, Obstetrics, Gynecology In 1 day call tomorrow for close follow-up [address]  [phone]            Patient's Medications   Discharge Prescriptions    No medications on file       No discharge procedures on file      PDMP Review     None          ED Provider  Electronically Signed by           Ja Sherman DO  08/08/22 9104

## 2022-08-09 NOTE — ED TRIAGE NOTES
Pt  Arrives to ED with complaints of  Vaginal bleeding x3 weeks  Pt  Reports weakness x3 days  Pt  Reports soaking through 3 pads/tampons an hour    Pt  Reports abd bloating and cramping

## 2022-08-09 NOTE — DISCHARGE INSTRUCTIONS
May use 600 mg ibuprofen for crampy pain and help decrease the vaginal bleeding call your OBGYN doctor in the morning
Yes

## 2022-08-24 ENCOUNTER — TELEPHONE (OUTPATIENT)
Dept: OBGYN CLINIC | Facility: CLINIC | Age: 22
End: 2022-08-24

## 2022-08-24 DIAGNOSIS — Z30.41 ENCOUNTER FOR SURVEILLANCE OF CONTRACEPTIVE PILLS: Primary | ICD-10-CM

## 2022-08-24 DIAGNOSIS — B96.89 BACTERIAL VAGINOSIS: ICD-10-CM

## 2022-08-24 DIAGNOSIS — N76.0 BACTERIAL VAGINOSIS: ICD-10-CM

## 2022-08-24 RX ORDER — NORGESTIMATE AND ETHINYL ESTRADIOL 7DAYSX3 LO
1 KIT ORAL DAILY
Qty: 84 TABLET | Refills: 3 | Status: SHIPPED | OUTPATIENT
Start: 2022-08-24

## 2022-08-24 RX ORDER — METRONIDAZOLE 500 MG/1
500 TABLET ORAL 2 TIMES DAILY
Qty: 14 TABLET | Refills: 0 | Status: SHIPPED | OUTPATIENT
Start: 2022-08-24 | End: 2022-08-31

## 2022-08-24 NOTE — TELEPHONE ENCOUNTER
Called pt  To discuss  States she requested a new birth control and has not received a call back  Upon review there is no record of pt  Requesting birth control  Reviewed with pt  She was advised to schedule appt  With Dr Maxine Nails to discuss  Pt  States she was just here and has been trying to call to make an appointment, but "no one answers " States she l/m on  line  Offered to schedule appointment for pt  She began yelling stating she was "just freaking here and the dr  Just needs to send in a prescription " Advised I will send request to provider and call her back  Pt  Disconnected phone call  Follow up tiger text sent to Moses Shields (practice administrator)

## 2022-08-24 NOTE — TELEPHONE ENCOUNTER
----- Message from Andreina Ruggiero sent at 8/24/2022  1:10 PM EDT -----  Regarding: Birth control   This is medical neglect  And every time I call the office no one answers either  Ridiculous

## 2022-08-24 NOTE — PROGRESS NOTES
Spoke to patient  Discussed birth control  Will send new OCP  Discussed that we are unable to predict what pills will work for what patient, sometimes it's trial and error til we find the right pill  Patient expresses frustration that a medication was sent to the pharmacy  Patient states she was not  Notified and therefore did not  the medication  I reviewed her pap from 7/20, and the message that was sent to the patient  She states she would prefer a phone call, which I discussed is not always feasible based on patient load/time of day/etc, but we would attempt to call in the future - note made in chart  Patient states she has tried to call the office and make a follow up appointment, but no one calls her back  There is record of messages exchanges via [facility]  Patient was yelling at staff over the phone today, and I discussed that this will not be tolerated, that there needs to be mutual respect to maintain a healthy doctor-patient relationship  We discussed that I am not always available and my nurses and PA will often field questions when I am unavailable or out of office  Pt requests flagyl be resent as she did not pick it up the first time  I sent OrthoTriCyclen Lo, and recommend patient start it with her next period  She is currently not bleeding  Patient verbalizes understanding

## 2022-08-25 ENCOUNTER — TELEPHONE (OUTPATIENT)
Dept: OBGYN CLINIC | Facility: CLINIC | Age: 22
End: 2022-08-25

## 2022-12-16 DIAGNOSIS — Z30.41 ENCOUNTER FOR SURVEILLANCE OF CONTRACEPTIVE PILLS: Primary | ICD-10-CM

## 2022-12-16 RX ORDER — DROSPIRENONE AND ETHINYL ESTRADIOL 0.03MG-3MG
1 KIT ORAL DAILY
Qty: 84 TABLET | Refills: 3 | Status: SHIPPED | OUTPATIENT
Start: 2022-12-16

## 2023-05-05 ENCOUNTER — TELEPHONE (OUTPATIENT)
Dept: PSYCHIATRY | Facility: CLINIC | Age: 23
End: 2023-05-05

## 2023-05-05 NOTE — TELEPHONE ENCOUNTER
Patient has been added to the Non-referralTalk Therapy wait list     Confirmed Insurance: Yes [x]  Location Preference: Therapy Anywhere  Provider Preference: Female  Virtual: Yes [x] No []    Address Verified  Phone Number Verified

## 2023-06-23 ENCOUNTER — OFFICE VISIT (OUTPATIENT)
Dept: FAMILY MEDICINE CLINIC | Facility: CLINIC | Age: 23
End: 2023-06-23
Payer: COMMERCIAL

## 2023-06-23 VITALS
SYSTOLIC BLOOD PRESSURE: 120 MMHG | OXYGEN SATURATION: 98 % | WEIGHT: 216 LBS | BODY MASS INDEX: 40.78 KG/M2 | HEART RATE: 74 BPM | DIASTOLIC BLOOD PRESSURE: 80 MMHG | TEMPERATURE: 98 F | HEIGHT: 61 IN

## 2023-06-23 DIAGNOSIS — Z23 NEED FOR VACCINATION: ICD-10-CM

## 2023-06-23 DIAGNOSIS — E28.2 PCOS (POLYCYSTIC OVARIAN SYNDROME): ICD-10-CM

## 2023-06-23 DIAGNOSIS — K21.9 GASTROESOPHAGEAL REFLUX DISEASE WITHOUT ESOPHAGITIS: ICD-10-CM

## 2023-06-23 DIAGNOSIS — Z00.00 ANNUAL PHYSICAL EXAM: Primary | ICD-10-CM

## 2023-06-23 DIAGNOSIS — F33.1 MAJOR DEPRESSIVE DISORDER, RECURRENT EPISODE, MODERATE (HCC): ICD-10-CM

## 2023-06-23 DIAGNOSIS — H66.90 ACUTE OTITIS MEDIA, UNSPECIFIED OTITIS MEDIA TYPE: ICD-10-CM

## 2023-06-23 PROCEDURE — 90715 TDAP VACCINE 7 YRS/> IM: CPT

## 2023-06-23 PROCEDURE — 99385 PREV VISIT NEW AGE 18-39: CPT | Performed by: FAMILY MEDICINE

## 2023-06-23 PROCEDURE — 90471 IMMUNIZATION ADMIN: CPT

## 2023-06-23 RX ORDER — AZITHROMYCIN 250 MG/1
TABLET, FILM COATED ORAL
Qty: 6 TABLET | Refills: 0 | Status: SHIPPED | OUTPATIENT
Start: 2023-06-23 | End: 2023-06-27

## 2023-06-23 RX ORDER — PANTOPRAZOLE SODIUM 40 MG/1
40 TABLET, DELAYED RELEASE ORAL
Qty: 30 TABLET | Refills: 5 | Status: SHIPPED | OUTPATIENT
Start: 2023-06-23 | End: 2023-12-20

## 2023-06-23 RX ORDER — DROSPIRENONE AND ETHINYL ESTRADIOL 0.03MG-3MG
1 KIT ORAL DAILY
COMMUNITY
End: 2023-07-07 | Stop reason: SDUPTHER

## 2023-06-23 NOTE — PROGRESS NOTES
ADULT ANNUAL [address]    NAME: Stephanie Charles  AGE: 21 y o  SEX: female  : 2000     DATE: 2023     Assessment and Plan:     Problem List Items Addressed This Visit    None      Immunizations and preventive care screenings were discussed with patient today  Appropriate education was printed on patient's after visit summary  Counseling:  · Exercise: the importance of regular exercise/physical activity was discussed  Recommend exercise 3-5 times per week for at least 30 minutes  No follow-ups on file  Chief Complaint:     Chief Complaint   Patient presents with   • Establish Care   • Physical Exam      History of Present Illness:     Adult Annual Physical   Patient here for a comprehensive physical exam  The patient reports no problems  Diet and Physical Activity  · Diet/Nutrition: well balanced diet  · Exercise: walking  Depression Screening  PHQ-2/9 Depression Screening    Little interest or pleasure in doing things: 1 - several days  Feeling down, depressed, or hopeless: 1 - several days  Trouble falling or staying asleep, or sleeping too much: 3 - nearly every day  Feeling tired or having little energy: 3 - nearly every day  Poor appetite or overeating: 3 - nearly every day  Feeling bad about yourself - or that you are a failure or have let yourself or your family down: 3 - nearly every day  Trouble concentrating on things, such as reading the newspaper or watching television: 1 - several days  Moving or speaking so slowly that other people could have noticed  Or the opposite - being so fidgety or restless that you have been moving around a lot more than usual: 1 - several days  Thoughts that you would be better off dead, or of hurting yourself in some way: 0 - not at all  PHQ-9 Score: 16   PHQ-9 Interpretation: Moderately severe depression        General Health  · Sleep: sleeps well     · Hearing: normal - bilateral   · Vision: no vision problems  · Dental: regular dental visits  /GYN Health  · Last menstrual period:   · Contraceptive method: oral contraceptives  · History of STDs?: no      Review of Systems:     Review of Systems   Constitutional: Negative for appetite change, chills and fever  HENT: Negative for ear pain, facial swelling, rhinorrhea, sinus pain, sore throat and trouble swallowing  Eyes: Negative for discharge and redness  Respiratory: Negative for chest tightness, shortness of breath and wheezing  Cardiovascular: Negative for chest pain and palpitations  Gastrointestinal: Negative for abdominal pain, diarrhea, nausea and vomiting  Endocrine: Negative for polyuria  Genitourinary: Negative for dysuria and urgency  Musculoskeletal: Negative for arthralgias and back pain  Skin: Negative for rash  Neurological: Negative for dizziness, weakness and headaches  Hematological: Negative for adenopathy  Psychiatric/Behavioral: Negative for behavioral problems, confusion and sleep disturbance  All other systems reviewed and are negative       Past Medical History:     Past Medical History:   Diagnosis Date   • Anxiety    • Clotting disorder ([facility] [address] )    • Depression    • Kidney stone    • PCOS (polycystic ovarian syndrome)    • Polycystic ovary syndrome    • Varicella     vaccinated      Past Surgical History:     Past Surgical History:   Procedure Laterality Date   • WISDOM TOOTH EXTRACTION        Social History:     Social History     Socioeconomic History   • Marital status: Single     Spouse name: None   • Number of children: 0   • Years of education: None   • Highest education level: High school graduate   Occupational History   • Occupation: [employer] [address] in [facility]:     Tobacco Use   • Smoking status: Never     Passive exposure: Past   • Smokeless tobacco: Never   Vaping Use   • Vaping Use: Never used   Substance and Sexual Activity   • Alcohol use: Yes     Alcohol/week: 2 0 - 3 0 standard drinks of alcohol     Types: 2 - 3 Glasses of wine per week     Comment: social   • Drug use: Not Currently     Types: Marijuana     Comment: socially in past    • Sexual activity: Yes     Partners: Male     Birth control/protection: Other     Comment: Pill   Other Topics Concern   • None   Social History Narrative    Recreational activities; running     Social Determinants of Health     Financial Resource Strain: Not on file   Food Insecurity: Not on file   Transportation Needs: Not on file   Physical Activity: Not on file   Stress: Not on file   Social Connections: Not on file   Intimate Partner Violence: Not At Risk (5/27/2021)    Humiliation, Afraid, Rape, and Kick questionnaire    • Fear of Current or Ex-Partner: No    • Emotionally Abused: No    • Physically Abused: No    • Sexually Abused: No   Housing Stability: Not on file      Family History:     Family History   Problem Relation Age of Onset   • Depression Mother    • Bipolar disorder Mother    • Alcohol abuse Mother         sober X 15 years   • Asthma Father    • Alcohol abuse Father    • Ovarian cysts Sister    • Anxiety disorder Sister    • Crohn's disease Maternal Grandmother    • Substance Abuse Maternal Uncle         OD heroin and fentanyl   • Substance Abuse Paternal Uncle    • Depression Family    • Anxiety disorder Family    • Diabetes Family    • Migraines Family    • ADD / ADHD Half-Brother       Current Medications:     Current Outpatient Medications   Medication Sig Dispense Refill   • drospirenone-ethinyl estradiol (Flaca) 3-0 03 MG per tablet Take 1 tablet by mouth daily     • drospirenone-ethinyl estradiol (HUMERA) 3-0 03 MG per tablet Take 1 tablet by mouth daily (Patient not taking: Reported on 6/23/2023) 84 tablet 3     No current facility-administered medications for this visit        Allergies:     No Known Allergies   Physical Exam:     /80   Pulse 74   Temp 98 "°F (36 7 °C)   Ht 5' 1\" (1 549 m)   Wt 98 kg (216 lb)   LMP 03/15/2023 (Approximate)   SpO2 98%   BMI 40 81 kg/m²     Physical Exam  Vitals and nursing note reviewed  Constitutional:       General: She is not in acute distress  Appearance: Normal appearance  She is not ill-appearing or diaphoretic  HENT:      Head: Normocephalic and atraumatic  Right Ear: Ear canal and external ear normal       Left Ear: Tympanic membrane, ear canal and external ear normal       Ears:      Comments: + Erythema of the TM, dull, with effusion on the left     Nose: Nose normal  No congestion or rhinorrhea  Mouth/Throat:      Mouth: Mucous membranes are moist       Pharynx: Oropharynx is clear  No posterior oropharyngeal erythema  Eyes:      General:         Right eye: No discharge  Left eye: No discharge  Extraocular Movements: Extraocular movements intact  Conjunctiva/sclera: Conjunctivae normal       Pupils: Pupils are equal, round, and reactive to light  Neck:      Vascular: No carotid bruit  Cardiovascular:      Rate and Rhythm: Normal rate and regular rhythm  Pulses: Normal pulses  Heart sounds: Normal heart sounds  No murmur heard  Pulmonary:      Effort: Pulmonary effort is normal  No respiratory distress  Breath sounds: Normal breath sounds  No wheezing or rhonchi  Abdominal:      General: Abdomen is flat  Bowel sounds are normal  There is no distension  Palpations: There is no mass  Tenderness: There is no abdominal tenderness  Musculoskeletal:         General: No swelling or deformity  Normal range of motion  Cervical back: Normal range of motion and neck supple  No rigidity  Right lower leg: No edema  Left lower leg: No edema  Lymphadenopathy:      Cervical: Cervical adenopathy present  Skin:     General: Skin is warm and dry  Capillary Refill: Capillary refill takes less than 2 seconds        Coloration: Skin is not " jaundiced  Findings: No bruising, erythema or rash  Neurological:      General: No focal deficit present  Mental Status: She is alert and oriented to person, place, and time  Cranial Nerves: No cranial nerve deficit  Sensory: No sensory deficit  Gait: Gait normal       Deep Tendon Reflexes: Reflexes normal    Psychiatric:         Mood and Affect: Mood normal          Behavior: Behavior normal          Thought Content:  Thought content normal          Judgment: Judgment normal           [address]

## 2023-07-06 ENCOUNTER — PATIENT MESSAGE (OUTPATIENT)
Dept: OBGYN CLINIC | Facility: CLINIC | Age: 23
End: 2023-07-06

## 2023-07-06 DIAGNOSIS — Z30.41 ENCOUNTER FOR SURVEILLANCE OF CONTRACEPTIVE PILLS: Primary | ICD-10-CM

## 2023-07-07 RX ORDER — DROSPIRENONE AND ETHINYL ESTRADIOL 0.03MG-3MG
1 KIT ORAL DAILY
Qty: 84 TABLET | Refills: 0 | Status: SHIPPED | OUTPATIENT
Start: 2023-07-07

## 2023-07-07 NOTE — PATIENT COMMUNICATION
Last yearly with Dr. George Valencia on 07/20/22  Next yearly scheduled with Dr. George Valencia on 07/26/23    Requests 1m of OCP to cover pt until appt

## 2023-11-01 ENCOUNTER — NURSE TRIAGE (OUTPATIENT)
Age: 23
End: 2023-11-01

## 2023-11-01 NOTE — TELEPHONE ENCOUNTER
Call placed in response to patients my chart message . Patient complains of UTI symptoms. Denies fever chills vomiting does complain of intermittent left sided flank pain. Patient will go to UC this evening.

## 2023-11-01 NOTE — TELEPHONE ENCOUNTER
Reason for Disposition  • All other females with painful urination, or patient wants to be seen    Additional Information  • Pain or burning with passing urine (urination) and female    Answer Assessment - Initial Assessment Questions  1. SYMPTOM: "What's the main symptom you're concerned about?" (e.g., frequency, incontinence)    Denies fever or vomiting patient will go to  today  Pains in back  but cause I have kidney stones     2. ONSET: "When did the  start?"     unsure      3. PAIN: "Is there any pain?" If Yes, ask: "How bad is it?" (Scale: 1-10; mild, moderate, severe)       Burning and discomfort when urinates    4.  CAUSE: "What do you think is causing the symptoms?"       UTI    5. OTHER SYMPTOMS: "Do you have any other symptoms?" (e.g., fever, flank pain, blood in urine, pain with urination)     Pain with urination    Protocols used: Urinary Symptoms-ADULT-[place], Urination Pain - Female-ADULT-[place]

## 2024-03-18 ENCOUNTER — TELEPHONE (OUTPATIENT)
Dept: OBGYN CLINIC | Facility: CLINIC | Age: 24
End: 2024-03-18

## 2024-03-18 DIAGNOSIS — Z30.41 ENCOUNTER FOR SURVEILLANCE OF CONTRACEPTIVE PILLS: ICD-10-CM

## 2024-03-19 RX ORDER — DROSPIRENONE AND ETHINYL ESTRADIOL 0.03MG-3MG
1 KIT ORAL DAILY
Qty: 84 TABLET | Refills: 0 | Status: SHIPPED | OUTPATIENT
Start: 2024-03-19

## 2024-03-27 NOTE — TELEPHONE ENCOUNTER
I spoke with patient. Patient aware medication Flaca was sent to pharmacy on 3/19/24. Patient aware  sometimes phramcy will give a different name of medication, she should call the pharmacy and ask for particular medication Flaca. Patient to call office back with any questions or concerns.

## 2024-05-15 DIAGNOSIS — Z30.41 ENCOUNTER FOR SURVEILLANCE OF CONTRACEPTIVE PILLS: ICD-10-CM

## 2024-05-15 RX ORDER — DROSPIRENONE AND ETHINYL ESTRADIOL 0.03MG-3MG
1 KIT ORAL DAILY
Qty: 84 TABLET | Refills: 1 | Status: SHIPPED | OUTPATIENT
Start: 2024-05-15

## 2024-08-21 ENCOUNTER — TELEPHONE (OUTPATIENT)
Age: 24
End: 2024-08-21

## 2024-08-21 NOTE — TELEPHONE ENCOUNTER
Patient called to see if there was anything in [place] or next available appointment.  Keeping 8/22/24 appointment.

## 2024-08-22 ENCOUNTER — ANNUAL EXAM (OUTPATIENT)
Dept: OBGYN CLINIC | Facility: CLINIC | Age: 24
End: 2024-08-22
Payer: COMMERCIAL

## 2024-08-22 ENCOUNTER — OFFICE VISIT (OUTPATIENT)
Dept: FAMILY MEDICINE CLINIC | Facility: CLINIC | Age: 24
End: 2024-08-22
Payer: COMMERCIAL

## 2024-08-22 ENCOUNTER — APPOINTMENT (OUTPATIENT)
Dept: LAB | Facility: CLINIC | Age: 24
End: 2024-08-22
Payer: COMMERCIAL

## 2024-08-22 VITALS
SYSTOLIC BLOOD PRESSURE: 102 MMHG | OXYGEN SATURATION: 98 % | HEART RATE: 72 BPM | DIASTOLIC BLOOD PRESSURE: 74 MMHG | BODY MASS INDEX: 40.02 KG/M2 | WEIGHT: 212 LBS | TEMPERATURE: 97.8 F | HEIGHT: 61 IN

## 2024-08-22 VITALS
BODY MASS INDEX: 40.59 KG/M2 | WEIGHT: 215 LBS | SYSTOLIC BLOOD PRESSURE: 120 MMHG | HEIGHT: 61 IN | DIASTOLIC BLOOD PRESSURE: 76 MMHG

## 2024-08-22 DIAGNOSIS — R10.9 FLANK PAIN: ICD-10-CM

## 2024-08-22 DIAGNOSIS — D64.9 ANEMIA, UNSPECIFIED TYPE: ICD-10-CM

## 2024-08-22 DIAGNOSIS — Z30.41 ENCOUNTER FOR SURVEILLANCE OF CONTRACEPTIVE PILLS: ICD-10-CM

## 2024-08-22 DIAGNOSIS — Z00.00 ANNUAL PHYSICAL EXAM: Primary | ICD-10-CM

## 2024-08-22 DIAGNOSIS — Z00.00 ANNUAL PHYSICAL EXAM: ICD-10-CM

## 2024-08-22 DIAGNOSIS — Z01.419 ENCOUNTER FOR WELL WOMAN EXAM: Primary | ICD-10-CM

## 2024-08-22 DIAGNOSIS — Z12.39 ENCOUNTER FOR SCREENING BREAST EXAMINATION: ICD-10-CM

## 2024-08-22 LAB
ALBUMIN SERPL BCG-MCNC: 4.4 G/DL (ref 3.5–5)
ALP SERPL-CCNC: 66 U/L (ref 34–104)
ALT SERPL W P-5'-P-CCNC: 14 U/L (ref 7–52)
ANION GAP SERPL CALCULATED.3IONS-SCNC: 9 MMOL/L (ref 4–13)
AST SERPL W P-5'-P-CCNC: 18 U/L (ref 13–39)
BASOPHILS # BLD AUTO: 0.04 THOUSANDS/ÂΜL (ref 0–0.1)
BASOPHILS NFR BLD AUTO: 1 % (ref 0–1)
BILIRUB SERPL-MCNC: 0.44 MG/DL (ref 0.2–1)
BUN SERPL-MCNC: 8 MG/DL (ref 5–25)
CALCIUM SERPL-MCNC: 9.7 MG/DL (ref 8.4–10.2)
CHLORIDE SERPL-SCNC: 102 MMOL/L (ref 96–108)
CHOLEST SERPL-MCNC: 185 MG/DL
CO2 SERPL-SCNC: 28 MMOL/L (ref 21–32)
CREAT SERPL-MCNC: 0.85 MG/DL (ref 0.6–1.3)
EOSINOPHIL # BLD AUTO: 0.03 THOUSAND/ÂΜL (ref 0–0.61)
EOSINOPHIL NFR BLD AUTO: 0 % (ref 0–6)
ERYTHROCYTE [DISTWIDTH] IN BLOOD BY AUTOMATED COUNT: 14.4 % (ref 11.6–15.1)
GFR SERPL CREATININE-BSD FRML MDRD: 96 ML/MIN/1.73SQ M
GLUCOSE P FAST SERPL-MCNC: 87 MG/DL (ref 65–99)
HCT VFR BLD AUTO: 42 % (ref 34.8–46.1)
HDLC SERPL-MCNC: 38 MG/DL
HGB BLD-MCNC: 13.1 G/DL (ref 11.5–15.4)
IMM GRANULOCYTES # BLD AUTO: 0.02 THOUSAND/UL (ref 0–0.2)
IMM GRANULOCYTES NFR BLD AUTO: 0 % (ref 0–2)
LDLC SERPL CALC-MCNC: 125 MG/DL (ref 0–100)
LYMPHOCYTES # BLD AUTO: 1.47 THOUSANDS/ÂΜL (ref 0.6–4.47)
LYMPHOCYTES NFR BLD AUTO: 19 % (ref 14–44)
MCH RBC QN AUTO: 25.7 PG (ref 26.8–34.3)
MCHC RBC AUTO-ENTMCNC: 31.2 G/DL (ref 31.4–37.4)
MCV RBC AUTO: 83 FL (ref 82–98)
MONOCYTES # BLD AUTO: 0.62 THOUSAND/ÂΜL (ref 0.17–1.22)
MONOCYTES NFR BLD AUTO: 8 % (ref 4–12)
NEUTROPHILS # BLD AUTO: 5.69 THOUSANDS/ÂΜL (ref 1.85–7.62)
NEUTS SEG NFR BLD AUTO: 72 % (ref 43–75)
NONHDLC SERPL-MCNC: 147 MG/DL
NRBC BLD AUTO-RTO: 0 /100 WBCS
PLATELET # BLD AUTO: 303 THOUSANDS/UL (ref 149–390)
PMV BLD AUTO: 11.4 FL (ref 8.9–12.7)
POTASSIUM SERPL-SCNC: 4 MMOL/L (ref 3.5–5.3)
PROT SERPL-MCNC: 7.4 G/DL (ref 6.4–8.4)
RBC # BLD AUTO: 5.09 MILLION/UL (ref 3.81–5.12)
SODIUM SERPL-SCNC: 139 MMOL/L (ref 135–147)
TRIGL SERPL-MCNC: 108 MG/DL
WBC # BLD AUTO: 7.87 THOUSAND/UL (ref 4.31–10.16)

## 2024-08-22 PROCEDURE — S0612 ANNUAL GYNECOLOGICAL EXAMINA: HCPCS | Performed by: PHYSICIAN ASSISTANT

## 2024-08-22 PROCEDURE — 36415 COLL VENOUS BLD VENIPUNCTURE: CPT

## 2024-08-22 PROCEDURE — 99395 PREV VISIT EST AGE 18-39: CPT | Performed by: FAMILY MEDICINE

## 2024-08-22 PROCEDURE — 85025 COMPLETE CBC W/AUTO DIFF WBC: CPT

## 2024-08-22 PROCEDURE — 80053 COMPREHEN METABOLIC PANEL: CPT

## 2024-08-22 PROCEDURE — 80061 LIPID PANEL: CPT

## 2024-08-22 PROCEDURE — 99213 OFFICE O/P EST LOW 20 MIN: CPT | Performed by: FAMILY MEDICINE

## 2024-08-22 RX ORDER — DROSPIRENONE AND ETHINYL ESTRADIOL 0.03MG-3MG
1 KIT ORAL DAILY
Qty: 84 TABLET | Refills: 4 | Status: SHIPPED | OUTPATIENT
Start: 2024-08-22

## 2024-08-22 NOTE — PROGRESS NOTES
Assessment/Plan:      Diagnoses and all orders for this visit:    Encounter for well woman exam    Encounter for screening breast examination    Encounter for surveillance of contraceptive pills  -     drospirenone-ethinyl estradiol (Flaca) 3-0.03 MG per tablet; Take 1 tablet by mouth daily          Subjective:     Patient ID: Kenna Ruggiero is a 24 y.o. female.    Pt presents for her annual exam today--  She has no complaints  She has h/o irreg bleeding/pcos  Regular bleeding, no pelvic pain--on JIMENA  Bowel and bladder are regular  No breast concerns today    No pap today.    Rx JIMENA  Daily mvi  Nsaids prn        Review of Systems   Constitutional:  Negative for chills, fever and unexpected weight change.   HENT:  Negative for ear pain and sore throat.    Eyes:  Negative for pain and visual disturbance.   Respiratory:  Negative for cough and shortness of breath.    Cardiovascular:  Negative for chest pain and palpitations.   Gastrointestinal:  Negative for abdominal pain, blood in stool, constipation, diarrhea and vomiting.   Genitourinary: Negative.  Negative for dysuria and hematuria.   Musculoskeletal:  Negative for arthralgias and back pain.   Skin:  Negative for color change and rash.   Neurological:  Negative for seizures and syncope.   All other systems reviewed and are negative.        Objective:     Physical Exam  Vitals and nursing note reviewed.   Constitutional:       Appearance: Normal appearance. She is well-developed.   HENT:      Head: Normocephalic and atraumatic.   Chest:   Breasts:     Right: No inverted nipple, mass, nipple discharge or skin change.      Left: No inverted nipple, mass, nipple discharge or skin change.   Abdominal:      Palpations: Abdomen is soft.   Genitourinary:     General: Normal vulva.      Exam position: Supine.      Labia:         Right: No rash, tenderness or lesion.         Left: No rash, tenderness or lesion.       Vagina: Normal.      Cervix: No cervical motion  tenderness, discharge or friability.      Adnexa:         Right: No mass, tenderness or fullness.          Left: No mass, tenderness or fullness.     Musculoskeletal:      Cervical back: Normal range of motion.   Lymphadenopathy:      Lower Body: No right inguinal adenopathy. No left inguinal adenopathy.   Neurological:      Mental Status: She is alert.

## 2024-08-22 NOTE — PATIENT INSTRUCTIONS
"Patient Education     Routine physical for adults   The Basics   Written by the doctors and editors at [facility]   What is a physical? -- A physical is a routine visit, or \"check-up,\" with your doctor. You might also hear it called a \"wellness visit\" or \"preventive visit.\"  During each visit, the doctor will:   Ask about your physical and mental health   Ask about your habits, behaviors, and lifestyle   Do an exam   Give you vaccines if needed   Talk to you about any medicines you take   Give advice about your health   Answer your questions  Getting regular check-ups is an important part of taking care of your health. It can help your doctor find and treat any problems you have. But it's also important for preventing health problems.  A routine physical is different from a \"sick visit.\" A sick visit is when you see a doctor because of a health concern or problem. Since physicals are scheduled ahead of time, you can think about what you want to ask the doctor.  How often should I get a physical? -- It depends on your age and health. In general, for people age 21 years and older:   If you are younger than 50 years, you might be able to get a physical every 3 years.   If you are 50 years or older, your doctor might recommend a physical every year.  If you have an ongoing health condition, like diabetes or high blood pressure, your doctor will probably want to see you more often.  What happens during a physical? -- In general, each visit will include:   Physical exam - The doctor or nurse will check your height, weight, heart rate, and blood pressure. They will also look at your eyes and ears. They will ask about how you are feeling and whether you have any symptoms that bother you.   Medicines - It's a good idea to bring a list of all the medicines you take to each doctor visit. Your doctor will talk to you about your medicines and answer any questions. Tell them if you are having any side effects that bother you. You " "should also tell them if you are having trouble paying for any of your medicines.   Habits and behaviors - This includes:   Your diet   Your exercise habits   Whether you smoke, drink alcohol, or use drugs   Whether you are sexually active   Whether you feel safe at home  Your doctor will talk to you about things you can do to improve your health and lower your risk of health problems. They will also offer help and support. For example, if you want to quit smoking, they can give you advice and might prescribe medicines. If you want to improve your diet or get more physical activity, they can help you with this, too.   Lab tests, if needed - The tests you get will depend on your age and situation. For example, your doctor might want to check your:   Cholesterol   Blood sugar   Iron level   Vaccines - The recommended vaccines will depend on your age, health, and what vaccines you already had. Vaccines are very important because they can prevent certain serious or deadly infections.   Discussion of screening - \"Screening\" means checking for diseases or other health problems before they cause symptoms. Your doctor can recommend screening based on your age, risk, and preferences. This might include tests to check for:   Cancer, such as breast, prostate, cervical, ovarian, colorectal, prostate, lung, or skin cancer   Sexually transmitted infections, such as chlamydia and gonorrhea   Mental health conditions like depression and anxiety  Your doctor will talk to you about the different types of screening tests. They can help you decide which screenings to have. They can also explain what the results might mean.   Answering questions - The physical is a good time to ask the doctor or nurse questions about your health. If needed, they can refer you to other doctors or specialists, too.  Adults older than 65 years often need other care, too. As you get older, your doctor will talk to you about:   How to prevent falling at " home   Hearing or vision tests   Memory testing   How to take your medicines safely   Making sure that you have the help and support you need at home  All topics are updated as new evidence becomes available and our peer review process is complete.  This topic retrieved from [employer] on: May 02, 2024.  Topic 774930 Version 1.0  Release: 32.4.3 - C32.122  © 2024 UpToDate, Inc. and/or its affiliates. All rights reserved.  Consumer Information Use and Disclaimer   Disclaimer: This generalized information is a limited summary of diagnosis, treatment, and/or medication information. It is not meant to be comprehensive and should be used as a tool to help the user understand and/or assess potential diagnostic and treatment options. It does NOT include all information about conditions, treatments, medications, side effects, or risks that may apply to a specific patient. It is not intended to be medical advice or a substitute for the medical advice, diagnosis, or treatment of a health care provider based on the health care provider's examination and assessment of a patient's specific and unique circumstances. Patients must speak with a health care provider for complete information about their health, medical questions, and treatment options, including any risks or benefits regarding use of medications. This information does not endorse any treatments or medications as safe, effective, or approved for treating a specific patient. UpToDate, Inc. and its affiliates disclaim any warranty or liability relating to this information or the use thereof.The use of this information is governed by the Terms of Use, available at https://www.woltersQuantumSphereuwer.com/en/know/clinical-effectiveness-terms. 2024© UpToDate, Inc. and its affiliates and/or licensors. All rights reserved.  Copyright   © 2024 UpToDate, Inc. and/or its affiliates. All rights reserved.

## 2024-08-22 NOTE — PROGRESS NOTES
Adult Annual Physical  Name: Kenna Ruggiero      : 2000      MRN: [id number]  Encounter Provider: Pinky Valencia DO  Encounter Date: 2024   Encounter department: [facility]    Assessment & Plan   1. Annual physical exam  -     Comprehensive metabolic panel; Future  -     Lipid panel; Future  2. Flank pain  -     CT renal stone study abdomen pelvis wo contrast; Future; Expected date: 2024  3. Anemia, unspecified type  -     CBC and differential; Future    Immunizations and preventive care screenings were discussed with patient today. Appropriate education was printed on patient's after visit summary.    Counseling:  Exercise: the importance of regular exercise/physical activity was discussed. Recommend exercise 3-5 times per week for at least 30 minutes.          History of Present Illness     Adult Annual Physical:  Patient presents for annual physical.     Diet and Physical Activity:  - Diet/Nutrition: well balanced diet.  - Exercise: moderate cardiovascular exercise.    Depression Screening:    - PHQ-9 Score: 5    General Health:  - Sleep: sleeps well.  - Hearing: normal hearing bilateral ears.  - Vision: no vision problems.  - Dental: regular dental visits.    /GYN Health:  - Follows with GYN: no.   - Menopause: premenopausal.   - History of STDs: no  - Contraception: oral contraceptives.      Advanced Care Planning:  - Has an advanced directive?: no    - Has a durable medical POA?: no    - ACP document given to patient?: yes      Review of Systems   Constitutional:  Negative for appetite change, chills and fever.   HENT:  Negative for ear pain, facial swelling, rhinorrhea, sinus pain, sore throat and trouble swallowing.    Eyes:  Negative for discharge and redness.   Respiratory:  Negative for chest tightness, shortness of breath and wheezing.    Cardiovascular:  Negative for chest pain and palpitations.   Gastrointestinal:  Negative for abdominal pain,  "diarrhea, nausea and vomiting.   Endocrine: Negative for polyuria.   Genitourinary:  Negative for dysuria and urgency.        + flank pain   Musculoskeletal:  Negative for arthralgias and back pain.   Skin:  Negative for rash.   Neurological:  Negative for dizziness, weakness and headaches.   Hematological:  Negative for adenopathy.   Psychiatric/Behavioral:  Negative for behavioral problems, confusion and sleep disturbance.    All other systems reviewed and are negative.    Pertinent Medical History   Anemia, renal lithiasis      Objective     /74   Pulse 72   Temp 97.8 °F (36.6 °C)   Ht 5' 1\" (1.549 m)   Wt 96.2 kg (212 lb)   LMP 08/17/2024 (Exact Date)   SpO2 98%   BMI 40.06 kg/m²     Physical Exam  Vitals and nursing note reviewed.   Constitutional:       General: She is not in acute distress.     Appearance: Normal appearance. She is well-developed. She is not ill-appearing or diaphoretic.   HENT:      Head: Normocephalic and atraumatic.      Right Ear: Tympanic membrane, ear canal and external ear normal.      Left Ear: Tympanic membrane, ear canal and external ear normal.      Nose: Nose normal. No congestion or rhinorrhea.      Mouth/Throat:      Mouth: Mucous membranes are moist.      Pharynx: Oropharynx is clear. No oropharyngeal exudate or posterior oropharyngeal erythema.   Eyes:      General: No scleral icterus.        Right eye: No discharge.         Left eye: No discharge.      Extraocular Movements: Extraocular movements intact.      Conjunctiva/sclera: Conjunctivae normal.      Pupils: Pupils are equal, round, and reactive to light.   Neck:      Thyroid: No thyromegaly.      Vascular: No carotid bruit or JVD.      Trachea: No tracheal deviation.   Cardiovascular:      Rate and Rhythm: Normal rate and regular rhythm.      Pulses: Normal pulses.      Heart sounds: Normal heart sounds. No murmur heard.  Pulmonary:      Effort: Pulmonary effort is normal. No respiratory distress.      " Breath sounds: Normal breath sounds. No stridor. No wheezing, rhonchi or rales.   Abdominal:      General: Abdomen is flat. Bowel sounds are normal. There is no distension.      Palpations: Abdomen is soft. There is no mass.      Tenderness: There is no abdominal tenderness. There is no guarding or rebound.   Musculoskeletal:         General: No swelling, tenderness or deformity. Normal range of motion.      Cervical back: Normal range of motion and neck supple. No rigidity.      Right lower leg: No edema.      Left lower leg: No edema.   Lymphadenopathy:      Cervical: No cervical adenopathy.   Skin:     General: Skin is warm and dry.      Capillary Refill: Capillary refill takes less than 2 seconds.      Coloration: Skin is not jaundiced.      Findings: No bruising, erythema or rash.   Neurological:      General: No focal deficit present.      Mental Status: She is alert and oriented to person, place, and time.      Cranial Nerves: No cranial nerve deficit.      Sensory: No sensory deficit.      Motor: No abnormal muscle tone.      Coordination: Coordination normal.      Gait: Gait normal.      Deep Tendon Reflexes: Reflexes are normal and symmetric. Reflexes normal.   Psychiatric:         Mood and Affect: Mood normal.         Behavior: Behavior normal.         Thought Content: Thought content normal.         Judgment: Judgment normal.       Administrative Statements   I have spent a total time of 20 minutes in caring for this patient on the day of the visit/encounter including Diagnostic results, Prognosis, Risks and benefits of tx options, Instructions for management, Patient and family education, and Importance of tx compliance.

## 2024-11-26 DIAGNOSIS — Z30.41 ENCOUNTER FOR SURVEILLANCE OF CONTRACEPTIVE PILLS: ICD-10-CM

## 2024-11-27 RX ORDER — DROSPIRENONE AND ETHINYL ESTRADIOL 0.03MG-3MG
1 KIT ORAL DAILY
Qty: 84 TABLET | Refills: 1 | Status: SHIPPED | OUTPATIENT
Start: 2024-11-27

## 2024-12-13 DIAGNOSIS — Z30.011 INITIATION OF OCP (BCP): Primary | ICD-10-CM

## 2024-12-13 RX ORDER — DROSPIRENONE AND ETHINYL ESTRADIOL 0.03MG-3MG
1 KIT ORAL DAILY
Qty: 90 TABLET | Refills: 4 | Status: SHIPPED | OUTPATIENT
Start: 2024-12-13